# Patient Record
Sex: FEMALE | Race: WHITE | NOT HISPANIC OR LATINO | URBAN - METROPOLITAN AREA
[De-identification: names, ages, dates, MRNs, and addresses within clinical notes are randomized per-mention and may not be internally consistent; named-entity substitution may affect disease eponyms.]

---

## 2018-11-19 ENCOUNTER — INPATIENT (INPATIENT)
Facility: HOSPITAL | Age: 48
LOS: 1 days | Discharge: HOME | DRG: 552 | End: 2018-11-21
Attending: INTERNAL MEDICINE | Admitting: INTERNAL MEDICINE
Payer: COMMERCIAL

## 2018-11-19 VITALS
OXYGEN SATURATION: 97 % | SYSTOLIC BLOOD PRESSURE: 189 MMHG | DIASTOLIC BLOOD PRESSURE: 90 MMHG | HEART RATE: 79 BPM | RESPIRATION RATE: 16 BRPM | TEMPERATURE: 98 F

## 2018-11-19 LAB
ALBUMIN SERPL ELPH-MCNC: 4.7 G/DL — SIGNIFICANT CHANGE UP (ref 3.3–5)
ALP SERPL-CCNC: 43 U/L — SIGNIFICANT CHANGE UP (ref 40–120)
ALT FLD-CCNC: 11 U/L — SIGNIFICANT CHANGE UP (ref 10–45)
ANION GAP SERPL CALC-SCNC: 14 MMOL/L — SIGNIFICANT CHANGE UP (ref 5–17)
APPEARANCE UR: CLEAR — SIGNIFICANT CHANGE UP
AST SERPL-CCNC: 15 U/L — SIGNIFICANT CHANGE UP (ref 10–40)
BASOPHILS NFR BLD AUTO: 0.1 % — SIGNIFICANT CHANGE UP (ref 0–2)
BILIRUB SERPL-MCNC: 0.6 MG/DL — SIGNIFICANT CHANGE UP (ref 0.2–1.2)
BILIRUB UR-MCNC: NEGATIVE — SIGNIFICANT CHANGE UP
BUN SERPL-MCNC: 15 MG/DL — SIGNIFICANT CHANGE UP (ref 7–23)
CALCIUM SERPL-MCNC: 9.4 MG/DL — SIGNIFICANT CHANGE UP (ref 8.4–10.5)
CHLORIDE SERPL-SCNC: 103 MMOL/L — SIGNIFICANT CHANGE UP (ref 96–108)
CO2 SERPL-SCNC: 23 MMOL/L — SIGNIFICANT CHANGE UP (ref 22–31)
COLOR SPEC: YELLOW — SIGNIFICANT CHANGE UP
CREAT SERPL-MCNC: 0.56 MG/DL — SIGNIFICANT CHANGE UP (ref 0.5–1.3)
DIFF PNL FLD: NEGATIVE — SIGNIFICANT CHANGE UP
GLUCOSE SERPL-MCNC: 120 MG/DL — HIGH (ref 70–99)
GLUCOSE UR QL: NEGATIVE — SIGNIFICANT CHANGE UP
HCT VFR BLD CALC: 42.7 % — SIGNIFICANT CHANGE UP (ref 34.5–45)
HGB BLD-MCNC: 14.5 G/DL — SIGNIFICANT CHANGE UP (ref 11.5–15.5)
KETONES UR-MCNC: 15 MG/DL
LEUKOCYTE ESTERASE UR-ACNC: NEGATIVE — SIGNIFICANT CHANGE UP
LYMPHOCYTES # BLD AUTO: 10.2 % — LOW (ref 13–44)
MCHC RBC-ENTMCNC: 31.5 PG — SIGNIFICANT CHANGE UP (ref 27–34)
MCHC RBC-ENTMCNC: 34 G/DL — SIGNIFICANT CHANGE UP (ref 32–36)
MCV RBC AUTO: 92.6 FL — SIGNIFICANT CHANGE UP (ref 80–100)
MONOCYTES NFR BLD AUTO: 5.8 % — SIGNIFICANT CHANGE UP (ref 2–14)
NEUTROPHILS NFR BLD AUTO: 83.9 % — HIGH (ref 43–77)
NITRITE UR-MCNC: NEGATIVE — SIGNIFICANT CHANGE UP
PH UR: 6 — SIGNIFICANT CHANGE UP (ref 5–8)
PLATELET # BLD AUTO: 237 K/UL — SIGNIFICANT CHANGE UP (ref 150–400)
POTASSIUM SERPL-MCNC: 4.1 MMOL/L — SIGNIFICANT CHANGE UP (ref 3.5–5.3)
POTASSIUM SERPL-SCNC: 4.1 MMOL/L — SIGNIFICANT CHANGE UP (ref 3.5–5.3)
PROT SERPL-MCNC: 8.2 G/DL — SIGNIFICANT CHANGE UP (ref 6–8.3)
PROT UR-MCNC: 30 MG/DL
RBC # BLD: 4.61 M/UL — SIGNIFICANT CHANGE UP (ref 3.8–5.2)
RBC # FLD: 12.9 % — SIGNIFICANT CHANGE UP (ref 10.3–16.9)
SODIUM SERPL-SCNC: 140 MMOL/L — SIGNIFICANT CHANGE UP (ref 135–145)
SP GR SPEC: >=1.03 — SIGNIFICANT CHANGE UP (ref 1–1.03)
UROBILINOGEN FLD QL: 0.2 E.U./DL — SIGNIFICANT CHANGE UP
WBC # BLD: 9.2 K/UL — SIGNIFICANT CHANGE UP (ref 3.8–10.5)
WBC # FLD AUTO: 9.2 K/UL — SIGNIFICANT CHANGE UP (ref 3.8–10.5)

## 2018-11-19 PROCEDURE — 72131 CT LUMBAR SPINE W/O DYE: CPT | Mod: 26

## 2018-11-19 PROCEDURE — 72148 MRI LUMBAR SPINE W/O DYE: CPT | Mod: 26

## 2018-11-19 PROCEDURE — 99285 EMERGENCY DEPT VISIT HI MDM: CPT

## 2018-11-19 PROCEDURE — 73562 X-RAY EXAM OF KNEE 3: CPT | Mod: 26,LT

## 2018-11-19 RX ORDER — MORPHINE SULFATE 50 MG/1
4 CAPSULE, EXTENDED RELEASE ORAL ONCE
Qty: 0 | Refills: 0 | Status: DISCONTINUED | OUTPATIENT
Start: 2018-11-19 | End: 2018-11-19

## 2018-11-19 RX ORDER — DIAZEPAM 5 MG
5 TABLET ORAL ONCE
Qty: 0 | Refills: 0 | Status: DISCONTINUED | OUTPATIENT
Start: 2018-11-19 | End: 2018-11-19

## 2018-11-19 RX ORDER — KETOROLAC TROMETHAMINE 30 MG/ML
15 SYRINGE (ML) INJECTION ONCE
Qty: 0 | Refills: 0 | Status: DISCONTINUED | OUTPATIENT
Start: 2018-11-19 | End: 2018-11-19

## 2018-11-19 RX ADMIN — Medication 15 MILLIGRAM(S): at 19:21

## 2018-11-19 RX ADMIN — MORPHINE SULFATE 4 MILLIGRAM(S): 50 CAPSULE, EXTENDED RELEASE ORAL at 19:23

## 2018-11-19 RX ADMIN — Medication 5 MILLIGRAM(S): at 19:23

## 2018-11-19 RX ADMIN — MORPHINE SULFATE 4 MILLIGRAM(S): 50 CAPSULE, EXTENDED RELEASE ORAL at 16:13

## 2018-11-19 NOTE — ED ADULT NURSE NOTE - CHPI ED NUR SYMPTOMS NEG
no constipation/no anorexia/no bladder dysfunction/no bowel dysfunction/no numbness/no neck tenderness/no tingling

## 2018-11-19 NOTE — ED PROVIDER NOTE - PHYSICAL EXAMINATION
CONSTITUTIONAL: Well-appearing; well-nourished; in no apparent distress.   HEAD: Normocephalic; atraumatic.   EYES: PERRL; EOM intact; conjunctiva and sclera clear  ENT: normal nose; no rhinorrhea; normal pharynx with no erythema or lesions.   NECK: Supple; non-tender;   CARDIOVASCULAR: Normal S1, S2; no murmurs, rubs, or gallops. Regular rate and rhythm.   RESPIRATORY: Breathing easily; breath sounds clear and equal bilaterally; no wheezes, rhonchi, or rales.  MSK: +tenderness L knee, no erythema, warmth or swelling. NO calf tenderness. Limited extension from hip due to pain.   Back: +ttp to L lumbar paraspinal into buttocks, no midline tenderness. Pt unable to twist or sit up due to pain   EXT: No cyanosis or edema; N/V intact  SKIN: Normal for age and race; warm; dry; good turgor; no apparent lesions or rash. CONSTITUTIONAL: Well-appearing; well-nourished; in no apparent distress.   HEAD: Normocephalic; atraumatic.   EYES: PERRL; EOM intact; conjunctiva and sclera clear  ENT: normal nose; no rhinorrhea; normal pharynx with no erythema or lesions.   NECK: Supple; non-tender;   CARDIOVASCULAR: Normal S1, S2; no murmurs, rubs, or gallops. Regular rate and rhythm.   RESPIRATORY: Breathing easily; breath sounds clear and equal bilaterally; no wheezes, rhonchi, or rales.  MSK: +tenderness L knee, no erythema, warmth or swelling. NO calf tenderness. Limited extension from hip due to pain.   Back: +ttp to L lumbar paraspinal into buttocks, no midline tenderness. Pt unable to twist or sit up due to pain   Rectal- good tone. No saddle anesthesia   EXT: No cyanosis or edema; N/V intact  SKIN: Normal for age and race; warm; dry; good turgor; no apparent lesions or rash.

## 2018-11-19 NOTE — ED ADULT NURSE NOTE - OBJECTIVE STATEMENT
Pt reports worsening sciatica since Thursday, reports no relief with medications that she was given from Urgent care and reports she is unable to walk

## 2018-11-19 NOTE — ED ADULT TRIAGE NOTE - OTHER COMPLAINTS
pt c.o lower abd pain, mostly L sided worsening today. reports seeing her doctor yesterday, prescribed medication with no relief. denies injury/fall

## 2018-11-19 NOTE — ED PROVIDER NOTE - OBJECTIVE STATEMENT
49 yo F with pmh of HTN and endometriosis c/o L lower back pain radiating around to front of thigh and into knee x 4 days. Pain is sharp and getting progressively worse. Pt states 4 days ago she bent over to unplug the TV and felt a pull in her back. The following day her pain started. Pt started taking alleve with no relief. Pt then went to urgent care yesterday, diagnosed with sciatica and given flexeril, motrin, percocet and medrol dose pack. Pt reports pain was worse today and she cannot put pressure on her L leg. Denies numbness, tingling, incontinence, abd pain, hematuria, dysuria.

## 2018-11-19 NOTE — ED PROVIDER NOTE - MEDICAL DECISION MAKING DETAILS
49 yo F with pmh of HTN and endometriosis c/o L lower back pain radiating around to front of thigh and into knee x 4 days. Progressively worsening despite pain meds. No CE symptoms. +tenderness L knee, no erythema, warmth or swelling. NO calf tenderness. Limited extension from hip due to pain; +ttp to L lumbar paraspinal into buttocks, no midline tenderness. Pt unable to twist or sit up due to pain 47 yo F with pmh of HTN and endometriosis c/o L lower back pain radiating around to front of thigh and into knee x 4 days. Progressively worsening despite pain meds. No CE symptoms. +tenderness L knee, no erythema, warmth or swelling. NO calf tenderness. Limited extension from hip due to pain; +ttp to L lumbar paraspinal into buttocks, no midline tenderness. Pt unable to twist or sit up due to pain. Likely sciatica.

## 2018-11-20 DIAGNOSIS — I10 ESSENTIAL (PRIMARY) HYPERTENSION: ICD-10-CM

## 2018-11-20 DIAGNOSIS — Z91.89 OTHER SPECIFIED PERSONAL RISK FACTORS, NOT ELSEWHERE CLASSIFIED: ICD-10-CM

## 2018-11-20 DIAGNOSIS — N80.1 ENDOMETRIOSIS OF OVARY: Chronic | ICD-10-CM

## 2018-11-20 DIAGNOSIS — M54.16 RADICULOPATHY, LUMBAR REGION: ICD-10-CM

## 2018-11-20 DIAGNOSIS — N80.9 ENDOMETRIOSIS, UNSPECIFIED: ICD-10-CM

## 2018-11-20 DIAGNOSIS — R63.8 OTHER SYMPTOMS AND SIGNS CONCERNING FOOD AND FLUID INTAKE: ICD-10-CM

## 2018-11-20 DIAGNOSIS — Z29.9 ENCOUNTER FOR PROPHYLACTIC MEASURES, UNSPECIFIED: ICD-10-CM

## 2018-11-20 DIAGNOSIS — M25.562 PAIN IN LEFT KNEE: ICD-10-CM

## 2018-11-20 PROCEDURE — 12345: CPT | Mod: NC,GC

## 2018-11-20 PROCEDURE — 99222 1ST HOSP IP/OBS MODERATE 55: CPT | Mod: GC

## 2018-11-20 RX ORDER — OXYCODONE HYDROCHLORIDE 5 MG/1
5 TABLET ORAL EVERY 6 HOURS
Qty: 0 | Refills: 0 | Status: DISCONTINUED | OUTPATIENT
Start: 2018-11-20 | End: 2018-11-21

## 2018-11-20 RX ORDER — DOCUSATE SODIUM 100 MG
100 CAPSULE ORAL
Qty: 0 | Refills: 0 | Status: DISCONTINUED | OUTPATIENT
Start: 2018-11-20 | End: 2018-11-21

## 2018-11-20 RX ORDER — METOPROLOL TARTRATE 50 MG
25 TABLET ORAL DAILY
Qty: 0 | Refills: 0 | Status: DISCONTINUED | OUTPATIENT
Start: 2018-11-20 | End: 2018-11-21

## 2018-11-20 RX ORDER — SENNA PLUS 8.6 MG/1
2 TABLET ORAL AT BEDTIME
Qty: 0 | Refills: 0 | Status: DISCONTINUED | OUTPATIENT
Start: 2018-11-20 | End: 2018-11-21

## 2018-11-20 RX ORDER — OXYCODONE HYDROCHLORIDE 5 MG/1
10 TABLET ORAL EVERY 12 HOURS
Qty: 0 | Refills: 0 | Status: DISCONTINUED | OUTPATIENT
Start: 2018-11-20 | End: 2018-11-21

## 2018-11-20 RX ORDER — KETOROLAC TROMETHAMINE 30 MG/ML
15 SYRINGE (ML) INJECTION EVERY 6 HOURS
Qty: 0 | Refills: 0 | Status: DISCONTINUED | OUTPATIENT
Start: 2018-11-20 | End: 2018-11-20

## 2018-11-20 RX ORDER — MEDROXYPROGESTERONE ACETATE 150 MG/ML
1 INJECTION, SUSPENSION, EXTENDED RELEASE INTRAMUSCULAR
Qty: 0 | Refills: 0 | COMMUNITY

## 2018-11-20 RX ORDER — POLYETHYLENE GLYCOL 3350 17 G/17G
17 POWDER, FOR SOLUTION ORAL
Qty: 0 | Refills: 0 | Status: DISCONTINUED | OUTPATIENT
Start: 2018-11-20 | End: 2018-11-21

## 2018-11-20 RX ORDER — OXYCODONE AND ACETAMINOPHEN 5; 325 MG/1; MG/1
1 TABLET ORAL EVERY 6 HOURS
Qty: 0 | Refills: 0 | Status: DISCONTINUED | OUTPATIENT
Start: 2018-11-20 | End: 2018-11-20

## 2018-11-20 RX ORDER — GABAPENTIN 400 MG/1
300 CAPSULE ORAL EVERY 8 HOURS
Qty: 0 | Refills: 0 | Status: DISCONTINUED | OUTPATIENT
Start: 2018-11-20 | End: 2018-11-21

## 2018-11-20 RX ORDER — FAMOTIDINE 10 MG/ML
20 INJECTION INTRAVENOUS DAILY
Qty: 0 | Refills: 0 | Status: DISCONTINUED | OUTPATIENT
Start: 2018-11-20 | End: 2018-11-21

## 2018-11-20 RX ORDER — KETOROLAC TROMETHAMINE 30 MG/ML
15 SYRINGE (ML) INJECTION EVERY 6 HOURS
Qty: 0 | Refills: 0 | Status: DISCONTINUED | OUTPATIENT
Start: 2018-11-20 | End: 2018-11-21

## 2018-11-20 RX ORDER — GABAPENTIN 400 MG/1
100 CAPSULE ORAL DAILY
Qty: 0 | Refills: 0 | Status: DISCONTINUED | OUTPATIENT
Start: 2018-11-20 | End: 2018-11-20

## 2018-11-20 RX ORDER — MORPHINE SULFATE 50 MG/1
4 CAPSULE, EXTENDED RELEASE ORAL ONCE
Qty: 0 | Refills: 0 | Status: DISCONTINUED | OUTPATIENT
Start: 2018-11-20 | End: 2018-11-20

## 2018-11-20 RX ORDER — ACETAMINOPHEN 500 MG
650 TABLET ORAL EVERY 6 HOURS
Qty: 0 | Refills: 0 | Status: DISCONTINUED | OUTPATIENT
Start: 2018-11-20 | End: 2018-11-21

## 2018-11-20 RX ORDER — METOPROLOL TARTRATE 50 MG
1 TABLET ORAL
Qty: 0 | Refills: 0 | COMMUNITY

## 2018-11-20 RX ADMIN — OXYCODONE HYDROCHLORIDE 10 MILLIGRAM(S): 5 TABLET ORAL at 22:30

## 2018-11-20 RX ADMIN — Medication 15 MILLIGRAM(S): at 19:07

## 2018-11-20 RX ADMIN — OXYCODONE HYDROCHLORIDE 10 MILLIGRAM(S): 5 TABLET ORAL at 22:29

## 2018-11-20 RX ADMIN — Medication 15 MILLIGRAM(S): at 15:00

## 2018-11-20 RX ADMIN — Medication 15 MILLIGRAM(S): at 07:52

## 2018-11-20 RX ADMIN — Medication 15 MILLIGRAM(S): at 20:01

## 2018-11-20 RX ADMIN — OXYCODONE HYDROCHLORIDE 10 MILLIGRAM(S): 5 TABLET ORAL at 12:00

## 2018-11-20 RX ADMIN — Medication 650 MILLIGRAM(S): at 06:46

## 2018-11-20 RX ADMIN — GABAPENTIN 300 MILLIGRAM(S): 400 CAPSULE ORAL at 14:33

## 2018-11-20 RX ADMIN — Medication 650 MILLIGRAM(S): at 19:59

## 2018-11-20 RX ADMIN — Medication 650 MILLIGRAM(S): at 07:30

## 2018-11-20 RX ADMIN — Medication 25 MILLIGRAM(S): at 06:46

## 2018-11-20 RX ADMIN — POLYETHYLENE GLYCOL 3350 17 GRAM(S): 17 POWDER, FOR SOLUTION ORAL at 18:40

## 2018-11-20 RX ADMIN — MORPHINE SULFATE 4 MILLIGRAM(S): 50 CAPSULE, EXTENDED RELEASE ORAL at 04:20

## 2018-11-20 RX ADMIN — Medication 15 MILLIGRAM(S): at 02:14

## 2018-11-20 RX ADMIN — GABAPENTIN 300 MILLIGRAM(S): 400 CAPSULE ORAL at 22:26

## 2018-11-20 RX ADMIN — Medication 650 MILLIGRAM(S): at 13:00

## 2018-11-20 RX ADMIN — SENNA PLUS 2 TABLET(S): 8.6 TABLET ORAL at 22:27

## 2018-11-20 RX ADMIN — Medication 650 MILLIGRAM(S): at 12:32

## 2018-11-20 RX ADMIN — Medication 15 MILLIGRAM(S): at 08:05

## 2018-11-20 RX ADMIN — Medication 650 MILLIGRAM(S): at 18:39

## 2018-11-20 RX ADMIN — MORPHINE SULFATE 4 MILLIGRAM(S): 50 CAPSULE, EXTENDED RELEASE ORAL at 04:00

## 2018-11-20 RX ADMIN — OXYCODONE HYDROCHLORIDE 10 MILLIGRAM(S): 5 TABLET ORAL at 11:20

## 2018-11-20 RX ADMIN — FAMOTIDINE 20 MILLIGRAM(S): 10 INJECTION INTRAVENOUS at 18:39

## 2018-11-20 RX ADMIN — Medication 15 MILLIGRAM(S): at 14:33

## 2018-11-20 RX ADMIN — Medication 100 MILLIGRAM(S): at 18:40

## 2018-11-20 NOTE — H&P ADULT - NSHPLABSRESULTS_GEN_ALL_CORE
.  LABS:                         14.5   9.2   )-----------( 237      ( 19 Nov 2018 16:10 )             42.7     11-19    140  |  103  |  15  ----------------------------<  120<H>  4.1   |  23  |  0.56    Ca    9.4      19 Nov 2018 16:10    TPro  8.2  /  Alb  4.7  /  TBili  0.6  /  DBili  x   /  AST  15  /  ALT  11  /  AlkPhos  43  11-19      Urinalysis Basic - ( 19 Nov 2018 18:11 )    Color: Yellow / Appearance: Clear / SG: >=1.030 / pH: x  Gluc: x / Ketone: 15 mg/dL  / Bili: Negative / Urobili: 0.2 E.U./dL   Blood: x / Protein: 30 mg/dL / Nitrite: NEGATIVE   Leuk Esterase: NEGATIVE / RBC: < 5 /HPF / WBC < 5 /HPF   Sq Epi: x / Non Sq Epi: x / Bacteria: Present /HPF                RADIOLOGY, EKG & ADDITIONAL TESTS:     < from: CT Lumbar Spine No Cont (11.19.18 @ 18:47) >      IMPRESSION: Degenerative disc disease with disc bulge at L5/S1 abutting   the left L5 nerve root. Large left foraminal disc herniation at L3/4   compressing the left L3 nerve root. Correlation with the patient's   clinical symptoms is recommended.    < end of copied text >    < from: MR Lumbar Spine No Cont (11.19.18 @ 21:33) >    IMPRESSION:   1.  No significant spinal canal stenosis.  2.  Disc herniations at L3-L4 and L5-S1 resulting in moderate to severe   left neural foraminal narrowing at those levels.    < end of copied text >    XRAY left knee per verbal radiology resident read: NO fracture, no effusion.

## 2018-11-20 NOTE — H&P ADULT - PROBLEM SELECTOR PLAN 2
L. knee pain is main complaint. Tender on exam, which should not be due to radiculopathy. XRAY reportedly unremarkable per radiology resident read. NO inciting trauma, unclear if pain is related to radiculopathy or other etiology.   - consider ortho consult in AM. L. knee pain is main complaint. Tender on exam, which should not be due to radiculopathy. XRAY reportedly unremarkable per radiology resident read. NO inciting trauma, unclear if pain is related to radiculopathy or other etiology.   - consider ortho consult in AM.    Addendum:  Resolved on exam at 5 am L. knee pain is main complaint. Tender on exam, which should not be due to radiculopathy. XRAY reportedly unremarkable per radiology resident read. NO inciting trauma, unclear if pain is related to radiculopathy or other etiology.   - ortho consult in AM.    Addendum:  Resolved on exam at 5 am

## 2018-11-20 NOTE — PHYSICAL THERAPY INITIAL EVALUATION ADULT - CRITERIA FOR SKILLED THERAPEUTIC INTERVENTIONS
anticipated discharge recommendation/impairments found/rehab potential/therapy frequency/functional limitations in following categories

## 2018-11-20 NOTE — H&P ADULT - PROBLEM SELECTOR PLAN 5
VTE PPX: IMPROVE 1 (for immobilization), low risk, no pharm ppx.  Encourage ambulation once pain is controlled.

## 2018-11-20 NOTE — H&P ADULT - NSHPREVIEWOFSYSTEMS_GEN_ALL_CORE
REVIEW OF SYSTEMS:    CONSTITUTIONAL: No weakness, fevers or chills  EYES/ENT: No visual changes;  No vertigo or throat pain   NECK: No pain or stiffness  RESPIRATORY: No cough, wheezing, hemoptysis; No shortness of breath  CARDIOVASCULAR: No chest pain or palpitations  GASTROINTESTINAL: No abdominal or epigastric pain. No vomiting, or hematemesis; No diarrhea or constipation. No melena or hematochezia.  GENITOURINARY: No dysuria, frequency or hematuria  NEUROLOGICAL:as above   SKIN: No itching, rashes

## 2018-11-20 NOTE — PROGRESS NOTE ADULT - ASSESSMENT
47 yo F w/PMH endometriosis, HTN presents to ED complaining of left knee pain and lower back pain found to have disc herniation at L3-L4 and L5-S1 with compressed nerve roots at L3 and possibly at L5. Admitted for pain management in the setting of radiculopathy due to herniated discs.

## 2018-11-20 NOTE — PHYSICAL THERAPY INITIAL EVALUATION ADULT - GENERAL OBSERVATIONS, REHAB EVAL
Pt received supine in bed with +hep-lock, NAD. Pt left supine in bed with call tatum in reach, LAKEISHA, RN awares, Dr Bal is notified post PT session.

## 2018-11-20 NOTE — CONSULT NOTE ADULT - SUBJECTIVE AND OBJECTIVE BOX
NP Note Neurosurgery Dr Mcknight Consult Note    History of Present Illness: 	  Patient is a 47 yo F w/PMH endometriosis, HTN presents to ED complaining of left knee pain and lower back pain. Patient had been in usual state of health until Thursday 11/15 when she bent over to pull out a plug from TV and felt a "pop" in her back. After that she started to experience pain in her lower back more on the left side radiating to her knee and anterior thigh. The pain continued to worsen so she went to an urgent care center on 11/18 where she was told she had sciatica and prescribed percocet, motrin, medrol and flexeril. She took these medications with minimal relief.  She woke up this AM with pain so severe she could not bear weight on her left side, which prompted her to present to the ED. The pain is mainly in her left knee. It is 10/10 at worst, 6/10 in ED after pain medication. Described as a cramping, burning pain. Worse with movement, minimally relieved by rest and morphine, toradol, diazepam in ED. Associated with paresthesias on right anterior thigh, no numbness. Strength intact, limited only due to pain. No saddle anesthesia, urinary or fecal incontinence. No fevers/chills/unintentional weight loss. + nasuea due to pain, no vomiting. This has never happened to her before.     In ED VS: Tm 98.2F, HR 79-82,  /90 -> 175/98,  RR 16-18, O2% 93-98 RA  MRI done showing disc herniations at L3-L4 and L5-S1 resulting in moderate to severe   left neural foraminal narrowing at those levels, CT L- spine showing degenerative disc disease with disc bulge at L5/S1 abutting the left L5 nerve root, large left foraminal disc herniation at L3/4 compressing the left L3 nerve root.  XRAY left knee unremarkable per radiology resident read.   Patient admitted for inability to ambulate likely 2/2 lumbar radiculopathy.     Review of Systems: REVIEW OF SYSTEMS:   CONSTITUTIONAL: No weakness, fevers or chills  EYES/ENT: No visual changes;  No vertigo or throat pain   NECK: No pain or stiffness  RESPIRATORY: No cough, wheezing, hemoptysis; No shortness of breath  CARDIOVASCULAR: No chest pain or palpitations  GASTROINTESTINAL: No abdominal or epigastric pain. No vomiting, or hematemesis; No diarrhea or constipation. No melena or hematochezia.  GENITOURINARY: No dysuria, frequency or hematuria  NEUROLOGICAL:as above  SKIN: No itching, rashes	  Other Review of Systems: All other review of systems negative, except as noted in HPI	    Allergies and Intolerances:        Allergies:  	No Known Allergies:     Home Medications:   * Patient Currently Takes Medications as of 20-Nov-2018 02:21 documented in Structured Notes  · 	Metoprolol Succinate ER 25 mg oral tablet, extended release: 1 tab(s) orally once a day, Last Dose Taken:    · 	Depo-Provera 400 mg/mL intramuscular suspension: 1 each intramuscular every 3 months, Last Dose Taken:      .    Patient History:   Past Medical History:  Endometriosis    Essential hypertension.    Past Surgical History:  Endometriotic cyst of ovary. NP Note Neurosurgery Dr Mcknight Consult Note    History of Present Illness: 	  Patient is a 49 yo F w/PMH endometriosis, HTN presents to ED complaining of left knee pain and lower back pain. Patient had been in usual state of health until Thursday 11/15 when she bent over to pull out a plug from TV and felt a "pop" in her back. After that she started to experience pain in her lower back more on the left side radiating to her knee and anterior thigh. The pain continued to worsen so she went to an urgent care center on 11/18 where she was told she had sciatica and prescribed percocet, motrin, medrol and flexeril. She took these medications with minimal relief.  She woke up this AM with pain so severe she could not bear weight on her left side, which prompted her to present to the ED. The pain is mainly in her left knee. It is 10/10 at worst, 6/10 in ED after pain medication. Described as a cramping, burning pain. Worse with movement, minimally relieved by rest and morphine, toradol, diazepam in ED. Associated with paresthesias on right anterior thigh, no numbness. Strength intact, limited only due to pain. No saddle anesthesia, urinary or fecal incontinence. No fevers/chills/unintentional weight loss. + nasuea due to pain, no vomiting. This has never happened to her before.     In ED VS: Tm 98.2F, HR 79-82,  /90 -> 175/98,  RR 16-18, O2% 93-98 RA  MRI done showing disc herniations at L3-L4 and L5-S1 resulting in moderate to severe   left neural foraminal narrowing at those levels, CT L- spine showing degenerative disc disease with disc bulge at L5/S1 abutting the left L5 nerve root, large left foraminal disc herniation at L3/4 compressing the left L3 nerve root.  XRAY left knee unremarkable per radiology resident read.   Patient admitted for inability to ambulate likely 2/2 lumbar radiculopathy.     Review of Systems: REVIEW OF SYSTEMS:   CONSTITUTIONAL: No weakness, fevers or chills  EYES/ENT: No visual changes;  No vertigo or throat pain   NECK: No pain or stiffness  RESPIRATORY: No cough, wheezing, hemoptysis; No shortness of breath  CARDIOVASCULAR: No chest pain or palpitations  GASTROINTESTINAL: No abdominal or epigastric pain. No vomiting, or hematemesis; No diarrhea or constipation. No melena or hematochezia.  GENITOURINARY: No dysuria, frequency or hematuria  NEUROLOGICAL:as above  SKIN: No itching, rashes	  Other Review of Systems: All other review of systems negative, except as noted in HPI	    Allergies and Intolerances:        Allergies:  	No Known Allergies:     Home Medications:   * Patient Currently Takes Medications as of 20-Nov-2018 02:21 documented in Structured Notes  · 	Metoprolol Succinate ER 25 mg oral tablet, extended release: 1 tab(s) orally once a day, Last Dose Taken:    · 	Depo-Provera 400 mg/mL intramuscular suspension: 1 each intramuscular every 3 months, Last Dose Taken:      .    Patient History:   Past Medical History:  Endometriosis    Essential hypertension.    Past Surgical History:  Endometriotic cyst of ovary.    	    ROS:  CV RRR  PV + peripheal pulses warm to touch + capillary refill  Pulm: Lungs CTAB Respirations regular and unlabored  GI: Abdomen round soft + BS  : Voiding without difficulty  Skin warm and dry  Neuro A&OX3 Cranial nerves intact  BROWN antigravity LLE 4/5 secondary to pain  JPS intact Bilaterally  no conus no hoffmans sign    A/ from: MR Lumbar Spine No Cont (11.19.18 @ 21:33) >  IMPRESSION:   1.  No significant spinal canal stenosis.  2.  Disc herniations at L3-L4 and L5-S1 resulting in moderate to severe   left neural foraminal narrowing at those levels.        < end of copied text >      P/  Monitor neuro status  OT/PT  Pain Managment  Neurology consulted if need to  No surgical intervention at this time as per Dr Mcknight  Please reconsult if pt neurologically declines    Dispo: Discussed with attending

## 2018-11-20 NOTE — H&P ADULT - HISTORY OF PRESENT ILLNESS
Patient is a 49 yo F w/PMH endometriosis, HTN presents to ED complaining of left knee pain and lower back pain. Patient had been in usual state of health until Thursday 11/15 when she bent over to pull out a plug from TV and felt a "pop" in her back. After that she started to experience pain in her lower back more on the left side radiating to her knee and anterior thigh. The pain continued to worsen so she went to an urgent care center on 11/18 where she was told she had sciatica and prescribed percocet, motrin, medrol and flexeril. She took these medications with minimal relief.  She woke up this AM with pain so severe she could not bear weight on her left side, which prompted her to present to the ED. The pain is mainly in her left knee. It is 10/10 at worst, 6/10 in ED after pain medication. Described as a cramping, burning pain. Worse with movement, minimally relieved by rest and morphine, toradol, diazepam in ED. Associated with paresthesias on right anterior thigh, no numbness. Strength intact, limited only due to pain. No saddle anesthesia, urinary or fecal incontinence. No fevers/chills/unintentional weight loss. + nasuea due to pain, no vomiting. This has never happened to her before.     In ED VS: Tm 98.2F, HR 79-82,  /90 -> 175/98,  RR 16-18, O2% 93-98 RA  MRI done showing disc herniations at L3-L4 and L5-S1 resulting in moderate to severe   left neural foraminal narrowing at those levels, CT L- spine showing degenerative disc disease with disc bulge at L5/S1 abutting the left L5 nerve root, large left foraminal disc herniation at L3/4 compressing the left L3 nerve root.  XRAY left knee unremarkable per radiology resident read.   Patient admitted for inability to ambulate likely 2/2 lumbar radiculopathy.

## 2018-11-20 NOTE — PHYSICAL THERAPY INITIAL EVALUATION ADULT - ADDITIONAL COMMENTS
Pt lives with spouse in an apartment with elevator. Prior to admission, pt ambulated independently without assistive device, pt has a rolling walker at home.

## 2018-11-20 NOTE — H&P ADULT - ASSESSMENT
47 yo F w/PMH endometriosis, HTN presents to ED complaining of left knee pain and lower back pain found to have disc herniation at L3-L4 and L5-S1 with compressed nerve roots at L3 and possibly at L5. Admitted for pain management in the setting of radiculopathy due to herniated discs. Also with significant knee pain.

## 2018-11-20 NOTE — H&P ADULT - NSHPPHYSICALEXAM_GEN_ALL_CORE
.  VITAL SIGNS:  T(F): 98.8 (11-20-18 @ 01:49), Max: 98.8 (11-20-18 @ 01:49)  HR: 79 (11-20-18 @ 01:49) (76 - 82)  BP: 175/98 (11-20-18 @ 01:49) (160/91 - 189/90)  BP(mean): --  RR: 18 (11-20-18 @ 01:49) (16 - 18)  SpO2: 93% (11-20-18 @ 01:49) (93% - 98%)    PHYSICAL EXAM:    Constitutional: Uncomfortable appearing, wincing in pain   HEENT: NC/AT, PERRL, EOMI, anicteric sclera, no nasal discharge; uvula midline, no oropharyngeal erythema or exudates; MMM  Neck: supple; no JVD or thyromegaly  Respiratory: CTA B/L; no W/R/R, no retractions  Cardiac: +S1/S2; RRR; no M/R/G; PMI non-displaced  Gastrointestinal: soft, NT/ND; no rebound or guarding; +BSx4  Back: unable to examine back due to patient's refusal. States she is in too much pain to turn over. Patient tearful and in significant pain.   Extremities: WWP, no clubbing or cyanosis; no peripheral edema  Musculoskeletal: NROM x4; Left knee tender to palpation of patella. No effusion noted, no erythema, no swelling. ROM full but limited by pain.   Vascular: 2+ radial, femoral, DP/PT pulses B/L  Dermatologic: skin warm, dry and intact; + rosacea on face and chest, per patient chronic.   Lymphatic: no submandibular or cervical LAD  Neurologic: AAOx3; CNII-XII grossly intact; LE strength 5/5 on R side, lifting right leg causes pain in left leg. Able to lift left leg off stretcher briefly but then tearful and unable to move further. No decreased sensation but subjective feeling of paresthesia over left anterior thigh. No saddle anesthesia. .  VITAL SIGNS:  T(F): 98.8 (11-20-18 @ 01:49), Max: 98.8 (11-20-18 @ 01:49)  HR: 79 (11-20-18 @ 01:49) (76 - 82)  BP: 175/98 (11-20-18 @ 01:49) (160/91 - 189/90)  BP(mean): --  RR: 18 (11-20-18 @ 01:49) (16 - 18)  SpO2: 93% (11-20-18 @ 01:49) (93% - 98%)    PHYSICAL EXAM:    Constitutional: Uncomfortable appearing, wincing in pain   HEENT: NC/AT, PERRL, EOMI, anicteric sclera, no nasal discharge; uvula midline, no oropharyngeal erythema or exudates; MMM  Neck: supple; no JVD or thyromegaly  Respiratory: CTA B/L; no W/R/R, no retractions  Cardiac: +S1/S2; RRR; no M/R/G; PMI non-displaced  Gastrointestinal: soft, NT/ND; no rebound or guarding; +BSx4  Back: unable to examine back due to patient's refusal. States she is in too much pain to turn over. Patient tearful and in significant pain.   Extremities: WWP, no clubbing or cyanosis; no peripheral edema  Musculoskeletal: NROM x4; Left knee tender to palpation of patella. No effusion noted, no erythema, no swelling. ROM full but limited by pain.   Vascular: 2+ radial, femoral, DP/PT pulses B/L  Dermatologic: skin warm, dry and intact; + rosacea on face and chest, per patient chronic.   Lymphatic: no submandibular or cervical LAD  Neurologic: AAOx3; CNII-XII grossly intact; LE strength 5/5 on R side, lifting right leg causes pain in left leg. Able to lift left leg off stretcher briefly but then tearful and unable to move further. No decreased sensation but subjective feeling of paresthesia over left anterior thigh. No saddle anesthesia.    Attending addendum:   Agree with exam as above with the following addition/change  On my exam pt's pain better controlled as she is s/p Morphine, knee no longer TTP, full ROM, no erythema, warmth

## 2018-11-20 NOTE — PHYSICAL THERAPY INITIAL EVALUATION ADULT - MANUAL MUSCLE TESTING RESULTS, REHAB EVAL
b/l UEs~4+/5 grossly, RLE~4+/5 grossly, LLE >3/5 through out, difficult to perform formal MMT on LLE due to pain.

## 2018-11-20 NOTE — H&P ADULT - NSHPROSALLOTHERNEGRD_GEN_ALL_CORE
Subjective


Subjective


Remarks


Notes reviewed


No fever


No new (+) BC


WBC scan with some uptake in mouth - no complaints however


LFTs improving


Repeat UA now with pyuria


Abdominal pain is gone


Patient states at times, after he urinates,  he has to go again soon after


PVR done by urology 150 ml


Antibiotics


Zosyn


Cipro


Lines


PIV


Past Medical History


HTN


DM Type 2


Urinary Obstruction and retention


BPH


Past Surgical History


Cystoscopy


Allergies:  


Coded Allergies:  


     Shellfish (Verified  Allergy, Severe, Anaphylaxis, 2/21/17)





Objective


.





 Vital Signs








  Date Time  Temp Pulse Resp B/P Pulse Ox O2 Delivery O2 Flow Rate FiO2


 


2/25/17 07:15  59      


 


2/25/17 07:05 95.7 59 17 163/98 96   


 


2/25/17 03:37 96.8 53 16 141/75 95   


 


2/25/17 02:13  54  157/82 98   


 


2/25/17 00:00 96.5 50 16 130/80 96   


 


2/24/17 23:50  58      


 


2/24/17 20:27 97.3 52 16 136/80 96   


 


2/24/17 18:48      Room Air  


 


2/24/17 18:47  54      


 


2/24/17 18:31     96   21


 


2/24/17 16:20 98.4 52 16 131/80 96   














 2/24/17 2/24/17 2/25/17





 15:00 23:00 07:00


 


Intake Total 1080 ml 480 ml 449 ml


 


Output Total   200 ml


 


Balance 1080 ml 480 ml 249 ml


 


   


 


Intake Oral 1080 ml 480 ml 240 ml


 


IV Total   209 ml


 


Output Urine Total   200 ml


 


# Voids 5 3 1


 


# Bowel Movements  1 0








.





Laboratory Tests








Test 2/25/17





 04:50


 


White Blood Count 9.4 TH/MM3


 


Red Blood Count 4.76 MIL/MM3


 


Hemoglobin 13.6 GM/DL


 


Hematocrit 40.0 %


 


Mean Corpuscular Volume 83.9 FL


 


Mean Corpuscular Hemoglobin 28.5 PG


 


Mean Corpuscular Hemoglobin 34.0 %





Concent 


 


Red Cell Distribution Width 12.9 %


 


Platelet Count 205 TH/MM3


 


Mean Platelet Volume 10.0 FL


 


Neutrophils (%) (Auto) 59.1 %


 


Lymphocytes (%) (Auto) 27.3 %


 


Monocytes (%) (Auto) 10.3 %


 


Eosinophils (%) (Auto) 2.7 %


 


Basophils (%) (Auto) 0.6 %


 


Neutrophils # (Auto) 5.6 TH/MM3


 


Lymphocytes # (Auto) 2.6 TH/MM3


 


Monocytes # (Auto) 1.0 TH/MM3


 


Eosinophils # (Auto) 0.3 TH/MM3


 


Basophils # (Auto) 0.1 TH/MM3


 


CBC Comment DIFF FINAL 


 


Differential Comment  








Laboratory Tests








Test 2/25/17





 04:50


 


Sodium Level 139 MEQ/L


 


Potassium Level 3.8 MEQ/L


 


Chloride Level 104 MEQ/L


 


Carbon Dioxide Level 27.3 MEQ/L


 


Anion Gap 8 MEQ/L


 


Blood Urea Nitrogen 13 MG/DL


 


Creatinine 1.31 MG/DL


 


Estimat Glomerular Filtration 67 ML/MIN





Rate 


 


Random Glucose 171 MG/DL


 


Calcium Level 8.6 MG/DL


 


Total Bilirubin 0.8 MG/DL


 


Direct Bilirubin 0.1 MG/DL


 


Indirect Bilirubin 0.7 MG/DL


 


Aspartate Amino Transf 65 U/L





(AST/SGOT) 


 


Alanine Aminotransferase 175 U/L





(ALT/SGPT) 


 


Alkaline Phosphatase 129 U/L


 


Total Protein 7.4 GM/DL


 


Albumin 3.0 GM/DL








Microbiology








 Date/Time Procedure Status





Source Growth 


 


 2/22/17 16:04 Aerobic Blood Culture - Preliminary Resulted





Blood Peripheral NO GROWTH IN 3 DAYS 





 2/22/17 16:04 Anaerobic Blood Culture - Final Resulted





Blood Peripheral QNS - SEE AEROBE REPORT 


 


 2/22/17 16:04 Aerobic Blood Culture - Preliminary Resulted





Blood Peripheral NO GROWTH IN 3 DAYS 





 2/22/17 16:04 Anaerobic Blood Culture - Final Resulted





Blood Peripheral QNS - SEE AEROBE REPORT 


 


 2/24/17 16:55 Urine Culture - Preliminary Resulted





Urine Clean Catch NO GROWTH IN 24 HOURS. 








Imaging














Gall Bladder Ultrasound 2/21/17 0228 Signed





Impressions: 





 Service Date/Time:  Tuesday, February 21, 2017 02:51 - CONCLUSION: Fatty 

liver.  





    López Peters MD 


 


Abdomen/Pelvis CT 2/21/17 0103 Signed





Impressions: 





 Service Date/Time:  Tuesday, February 21, 2017 01:51 - CONCLUSION:  1. 

Prostatic 





 enlargement with suspected very prominent compression on the bladder floor 





 creating a masslike area in the bladder floor. 2. Fatty infiltration liver. 3. 





 Nonobstructing left renal stone. 4. Bibasilar areas of consolidation or 





 atelectasis at the lung bases    López Peters MD 


 


Chest X-Ray 2/21/17 0018 Signed





Impressions: 





 Service Date/Time:  Tuesday, February 21, 2017 00:34 - CONCLUSION:  Expiratory 





 chest x-ray with suspected borderline cardiomegaly.     López Peters MD 








Physical Exam


GENERAL:   awake and alert,  NAD


SKIN:   Warm and dry.  No generalized rash 


HEENT:  Pink conjunctivae, no petechia or hemorrhage.  Moist oral mucosa.  


NECK:  Supple, nontender, no meningeal signs.


CARDIOVASCULAR: Regular rate and rhythm without murmurs, gallops, or rubs. 


RESPIRATORY: Clear to auscultation. Breath sounds equal bilaterally. No wheezes

, rales, or rhonchi.  


GASTROINTESTINAL: Abdomen soft,  not distended, not tender.  No guarding. No 

rebound


MUSCULOSKELETAL: Extremities without clubbing, cyanosis, or edema.  No calf 

tenderness.  


NEUROLOGICAL:   Non-focal


PSYCH:  Normal affect, calm and cooperative


LINE:  PIV with no evidence of infection





Assessment & Plan


Remarks


IMPRESSION


Klebsiella and Enterobacter bacteremia, source?


   -  ?GI/biliary, ?


   -  ?prostate


   -  clinically no evidence of PNA,  CT findings likely more of atelectasis 

than consolidation


   -  ?intermittent incomplete emptying


   -  no dental complaints


Had previous PSAE bacteremia


Previous problem with urinary retention


Enlarged prostate


Renal insufficiency


Diabetes








RECOMMENDATION


Change to Levaquin (Once a day)


Stop  IV Zosyn after today


Repeat Urine C/S


Monitor progress


Likely 2-3 weeks more of oral Abx on D/C





Explained plan to patient








Kylah Duarte MD Feb 25, 2017 13:36 All other review of systems negative, except as noted in HPI

## 2018-11-20 NOTE — H&P ADULT - PROBLEM SELECTOR PLAN 3
BP elevated 183/68. Patient took her home toprol this AM. Likely elevated BP in the setting of severe pain.   - pain management as above  - if still elevated when pain is controlled consider adding another agent  - c/w toprol XL 25 mg PO daily

## 2018-11-20 NOTE — H&P ADULT - PROBLEM SELECTOR PLAN 1
Patient with CT/MRI L spine showing disc herniation at L3-L4 and L5-S1 with compressed nerve roots at L3 and possibly at L5. Consistent with exam, with pain down anterior thigh, worse at the knee.   - toradol 15 mg IV q6 PRN given no kidney issues or history of bleeding  - morphine 4 mg IV PRN severe pain (order as needed)  - will start gabapentin at low dose 100 mg PO daily given neuropathic pain.  - consider ortho consult in AM  - consider pain management consult in AM  - PT Patient with CT/MRI L spine showing disc herniation at L3-L4 and L5-S1 with compressed nerve roots at L3 and possibly at L5. Consistent with exam, with pain down anterior thigh, worse at the knee.   -Tylenol Standing  - toradol 15 mg IV q6 given no kidney issues or history of bleeding  -Percocet PRN  - morphine 4 mg IV PRN severe pain (order as needed)  - will start gabapentin at low dose 100 mg PO daily given neuropathic pain.  - ortho consult  - pain management consult in AM  - PT

## 2018-11-20 NOTE — PROGRESS NOTE ADULT - PROBLEM SELECTOR PLAN 2
L. knee pain was initially main complaint, now pain is resolved in knee, but reports some loss of sensation  - treatment as above    Addendum:  Resolved on exam at 5 am L. knee pain was initially main complaint, now pain is resolved in knee, but reports some loss of sensation  - treatment as above

## 2018-11-21 VITALS
TEMPERATURE: 98 F | RESPIRATION RATE: 18 BRPM | DIASTOLIC BLOOD PRESSURE: 90 MMHG | OXYGEN SATURATION: 97 % | SYSTOLIC BLOOD PRESSURE: 149 MMHG | HEART RATE: 86 BPM

## 2018-11-21 PROCEDURE — 72131 CT LUMBAR SPINE W/O DYE: CPT

## 2018-11-21 PROCEDURE — 99285 EMERGENCY DEPT VISIT HI MDM: CPT | Mod: 25

## 2018-11-21 PROCEDURE — 73562 X-RAY EXAM OF KNEE 3: CPT

## 2018-11-21 PROCEDURE — 80053 COMPREHEN METABOLIC PANEL: CPT

## 2018-11-21 PROCEDURE — 96374 THER/PROPH/DIAG INJ IV PUSH: CPT

## 2018-11-21 PROCEDURE — 99239 HOSP IP/OBS DSCHRG MGMT >30: CPT

## 2018-11-21 PROCEDURE — 85025 COMPLETE CBC W/AUTO DIFF WBC: CPT

## 2018-11-21 PROCEDURE — 84702 CHORIONIC GONADOTROPIN TEST: CPT

## 2018-11-21 PROCEDURE — 96376 TX/PRO/DX INJ SAME DRUG ADON: CPT

## 2018-11-21 PROCEDURE — 96375 TX/PRO/DX INJ NEW DRUG ADDON: CPT

## 2018-11-21 PROCEDURE — 72148 MRI LUMBAR SPINE W/O DYE: CPT

## 2018-11-21 PROCEDURE — 97116 GAIT TRAINING THERAPY: CPT

## 2018-11-21 PROCEDURE — 81001 URINALYSIS AUTO W/SCOPE: CPT

## 2018-11-21 PROCEDURE — 97161 PT EVAL LOW COMPLEX 20 MIN: CPT

## 2018-11-21 RX ORDER — GABAPENTIN 400 MG/1
1 CAPSULE ORAL
Qty: 21 | Refills: 0 | OUTPATIENT
Start: 2018-11-21 | End: 2018-11-27

## 2018-11-21 RX ORDER — SENNA PLUS 8.6 MG/1
2 TABLET ORAL
Qty: 0 | Refills: 0 | COMMUNITY
Start: 2018-11-21

## 2018-11-21 RX ORDER — OXYCODONE HYDROCHLORIDE 5 MG/1
1 TABLET ORAL
Qty: 20 | Refills: 0 | OUTPATIENT
Start: 2018-11-21 | End: 2018-11-25

## 2018-11-21 RX ORDER — FAMOTIDINE 10 MG/ML
1 INJECTION INTRAVENOUS
Qty: 7 | Refills: 0 | OUTPATIENT
Start: 2018-11-21 | End: 2018-11-27

## 2018-11-21 RX ORDER — OXYCODONE HYDROCHLORIDE 5 MG/1
1 TABLET ORAL
Qty: 14 | Refills: 0 | OUTPATIENT
Start: 2018-11-21 | End: 2018-11-27

## 2018-11-21 RX ORDER — DOCUSATE SODIUM 100 MG
1 CAPSULE ORAL
Qty: 0 | Refills: 0 | COMMUNITY
Start: 2018-11-21

## 2018-11-21 RX ORDER — OXYCODONE HYDROCHLORIDE 5 MG/1
1 TABLET ORAL
Qty: 10 | Refills: 0 | OUTPATIENT
Start: 2018-11-21 | End: 2018-11-25

## 2018-11-21 RX ORDER — POLYETHYLENE GLYCOL 3350 17 G/17G
17 POWDER, FOR SOLUTION ORAL
Qty: 0 | Refills: 0 | COMMUNITY
Start: 2018-11-21

## 2018-11-21 RX ORDER — OXYCODONE HYDROCHLORIDE 5 MG/1
1 TABLET ORAL
Qty: 28 | Refills: 0 | OUTPATIENT
Start: 2018-11-21 | End: 2018-11-27

## 2018-11-21 RX ADMIN — Medication 650 MILLIGRAM(S): at 12:08

## 2018-11-21 RX ADMIN — Medication 100 MILLIGRAM(S): at 06:38

## 2018-11-21 RX ADMIN — Medication 100 MILLIGRAM(S): at 17:07

## 2018-11-21 RX ADMIN — Medication 650 MILLIGRAM(S): at 06:39

## 2018-11-21 RX ADMIN — OXYCODONE HYDROCHLORIDE 10 MILLIGRAM(S): 5 TABLET ORAL at 06:39

## 2018-11-21 RX ADMIN — Medication 40 MILLIGRAM(S): at 15:54

## 2018-11-21 RX ADMIN — OXYCODONE HYDROCHLORIDE 5 MILLIGRAM(S): 5 TABLET ORAL at 11:58

## 2018-11-21 RX ADMIN — POLYETHYLENE GLYCOL 3350 17 GRAM(S): 17 POWDER, FOR SOLUTION ORAL at 06:37

## 2018-11-21 RX ADMIN — POLYETHYLENE GLYCOL 3350 17 GRAM(S): 17 POWDER, FOR SOLUTION ORAL at 17:08

## 2018-11-21 RX ADMIN — Medication 650 MILLIGRAM(S): at 06:38

## 2018-11-21 RX ADMIN — OXYCODONE HYDROCHLORIDE 5 MILLIGRAM(S): 5 TABLET ORAL at 10:30

## 2018-11-21 RX ADMIN — Medication 15 MILLIGRAM(S): at 09:17

## 2018-11-21 RX ADMIN — OXYCODONE HYDROCHLORIDE 10 MILLIGRAM(S): 5 TABLET ORAL at 17:07

## 2018-11-21 RX ADMIN — FAMOTIDINE 20 MILLIGRAM(S): 10 INJECTION INTRAVENOUS at 12:08

## 2018-11-21 RX ADMIN — Medication 15 MILLIGRAM(S): at 03:18

## 2018-11-21 RX ADMIN — GABAPENTIN 300 MILLIGRAM(S): 400 CAPSULE ORAL at 13:33

## 2018-11-21 RX ADMIN — GABAPENTIN 300 MILLIGRAM(S): 400 CAPSULE ORAL at 06:38

## 2018-11-21 RX ADMIN — Medication 650 MILLIGRAM(S): at 17:07

## 2018-11-21 RX ADMIN — Medication 650 MILLIGRAM(S): at 13:22

## 2018-11-21 RX ADMIN — Medication 25 MILLIGRAM(S): at 06:38

## 2018-11-21 NOTE — PROGRESS NOTE ADULT - PROBLEM SELECTOR PLAN 5
VTE PPX: IMPROVE 1 (for immobilization), low risk, no pharm ppx.  Encourage ambulation once pain is controlled.  GI: famotidine  constipation: senna/colace/miralax
VTE PPX: IMPROVE 1 (for immobilization), low risk, no pharm ppx.  Encourage ambulation once pain is controlled.  GI: famotidine  constipation: senna/colace/miralax

## 2018-11-21 NOTE — DIETITIAN INITIAL EVALUATION ADULT. - ENERGY NEEDS
Ideal body weight used for calculations as pt >120% of IBW.   ABW 94.7kg, IBW 56kg, 167% IBW, ht 65", BMI 34.7   Nutrient needs based on [facility] standards of care for maintenance in adults.

## 2018-11-21 NOTE — DISCHARGE NOTE ADULT - PATIENT PORTAL LINK FT
You can access the [facility] Patient Portal, offered by [facility], by registering with the following website: [email or web address]

## 2018-11-21 NOTE — PROGRESS NOTE ADULT - SUBJECTIVE AND OBJECTIVE BOX
OVERNIGHT EVENTS: admitted    SUBJECTIVE: Patient reports that back pain was 2/10 this morning when not moving, but 8-9/10 upon movement. Was unable to roll over in bed. No headache, N/V, CP, SOB, abdominal pain, normal BMs and urination. Also reports some decreased sensation over her L knee.    Vital Signs Last 12 Hrs  T(F): 98.8 (11-20-18 @ 16:01), Max: 98.8 (11-20-18 @ 16:01)  HR: 80 (11-20-18 @ 16:01) (80 - 92)  BP: 150/89 (11-20-18 @ 16:01) (135/90 - 157/79)  BP(mean): --  RR: 18 (11-20-18 @ 16:01) (17 - 19)  SpO2: 98% (11-20-18 @ 16:01) (96% - 98%)  I&O's Summary      PHYSICAL EXAM:  Constitutional: NAD, comfortable in bed when lying flat.  HEENT: NC/AT, PERRLA, EOMI, no conjunctival pallor or scleral icterus, MMM  Neck: Supple, no JVD  Respiratory: Normal rate, rhythm, depth, effort. CTAB. No w/r/r.   Cardiovascular: RRR, normal S1 and S2, no m/r/g.   Gastrointestinal: +BS, soft NTND, no guarding or rebound tenderness, no palpable masses   Extremities: wwp; no cyanosis, clubbing or edema.   Vascular: Pulses equal and strong throughout.   Neurological: AAOx3, no CN deficits, pain illicited in L inguinal canal when lifting R leg to 45 degrees and L leg to 20 degrees. reflexes decreased 1+ in L patella, 2+ on R patella. Strength examination limited by pain. diminished sensation over medial aspect of L patella.  Skin: No gross skin abnormalities or rashes        LABS:                        14.5   9.2   )-----------( 237      ( 19 Nov 2018 16:10 )             42.7     11-19    140  |  103  |  15  ----------------------------<  120<H>  4.1   |  23  |  0.56    Ca    9.4      19 Nov 2018 16:10    TPro  8.2  /  Alb  4.7  /  TBili  0.6  /  DBili  x   /  AST  15  /  ALT  11  /  AlkPhos  43  11-19      Urinalysis Basic - ( 19 Nov 2018 18:11 )    Color: Yellow / Appearance: Clear / SG: >=1.030 / pH: x  Gluc: x / Ketone: 15 mg/dL  / Bili: Negative / Urobili: 0.2 E.U./dL   Blood: x / Protein: 30 mg/dL / Nitrite: NEGATIVE   Leuk Esterase: NEGATIVE / RBC: < 5 /HPF / WBC < 5 /HPF   Sq Epi: x / Non Sq Epi: x / Bacteria: Present /HPF        RADIOLOGY & ADDITIONAL TESTS:    MEDICATIONS  (STANDING):  acetaminophen   Tablet .. 650 milliGRAM(s) Oral every 6 hours  docusate sodium 100 milliGRAM(s) Oral two times a day  gabapentin 300 milliGRAM(s) Oral every 8 hours  ketorolac   Injectable 15 milliGRAM(s) IV Push every 6 hours  metoprolol succinate ER 25 milliGRAM(s) Oral daily  oxyCODONE  ER Tablet 10 milliGRAM(s) Oral every 12 hours  polyethylene glycol 3350 17 Gram(s) Oral two times a day  senna 2 Tablet(s) Oral at bedtime    MEDICATIONS  (PRN):  oxyCODONE    IR 5 milliGRAM(s) Oral every 6 hours PRN Severe Pain (7 - 10)
OVERNIGHT EVENTS: ANNA    SUBJECTIVE: Patient reports L hamstring cramping pain upon rolling over. also reports continued L knee numbness and pain. States she has improved LE mobility and strength however. Normal urination and BMs.    Vital Signs Last 12 Hrs  T(F): 98.1 (11-21-18 @ 08:29), Max: 98.7 (11-21-18 @ 05:23)  HR: 77 (11-21-18 @ 08:29) (77 - 83)  BP: 149/93 (11-21-18 @ 08:29) (149/93 - 151/96)  BP(mean): --  RR: 18 (11-21-18 @ 08:29) (18 - 18)  SpO2: 96% (11-21-18 @ 08:29) (96% - 96%)  I&O's Summary    20 Nov 2018 07:01  -  21 Nov 2018 07:00  --------------------------------------------------------  IN: 0 mL / OUT: 250 mL / NET: -250 mL        PHYSICAL EXAM:  Constitutional: NAD, comfortable in bed lying flat.  HEENT: NC/AT, PERRLA, EOMI, no conjunctival pallor or scleral icterus, MMM  Neck: Supple, no JVD  Respiratory: Normal rate, rhythm, depth, effort. CTAB. No w/r/r.   Cardiovascular: RRR, normal S1 and S2, no m/r/g.   Gastrointestinal: +BS, soft NTND, no guarding or rebound tenderness, no palpable masses   Extremities: wwp; no cyanosis, clubbing or edema.   Vascular: Pulses equal and strong throughout.   Neurological: AAOx3, no CN deficits, 4/5 strength in bilateral lower extremity proximal and distal flexors and extensors, patellar reflexes 1+ bilaterally, decreased sensation on left knee, no decreased sensation above or below the knee. able to lift both legs to 45 degrees before being limited by pain radiating to the back  Skin: No gross skin abnormalities or rashes        LABS:                        14.5   9.2   )-----------( 237      ( 19 Nov 2018 16:10 )             42.7     11-19    140  |  103  |  15  ----------------------------<  120<H>  4.1   |  23  |  0.56    Ca    9.4      19 Nov 2018 16:10    TPro  8.2  /  Alb  4.7  /  TBili  0.6  /  DBili  x   /  AST  15  /  ALT  11  /  AlkPhos  43  11-19      Urinalysis Basic - ( 19 Nov 2018 18:11 )    Color: Yellow / Appearance: Clear / SG: >=1.030 / pH: x  Gluc: x / Ketone: 15 mg/dL  / Bili: Negative / Urobili: 0.2 E.U./dL   Blood: x / Protein: 30 mg/dL / Nitrite: NEGATIVE   Leuk Esterase: NEGATIVE / RBC: < 5 /HPF / WBC < 5 /HPF   Sq Epi: x / Non Sq Epi: x / Bacteria: Present /HPF        RADIOLOGY & ADDITIONAL TESTS:    MEDICATIONS  (STANDING):  acetaminophen   Tablet .. 650 milliGRAM(s) Oral every 6 hours  docusate sodium 100 milliGRAM(s) Oral two times a day  famotidine    Tablet 20 milliGRAM(s) Oral daily  gabapentin 300 milliGRAM(s) Oral every 8 hours  ketorolac   Injectable 15 milliGRAM(s) IV Push every 6 hours  metoprolol succinate ER 25 milliGRAM(s) Oral daily  oxyCODONE  ER Tablet 10 milliGRAM(s) Oral every 12 hours  polyethylene glycol 3350 17 Gram(s) Oral two times a day  senna 2 Tablet(s) Oral at bedtime    MEDICATIONS  (PRN):  oxyCODONE    IR 5 milliGRAM(s) Oral every 6 hours PRN Severe Pain (7 - 10)

## 2018-11-21 NOTE — PROGRESS NOTE ADULT - PROBLEM SELECTOR PLAN 4
No active issues, depot shot as OP once every 3 mos.
No active issues, depot shot as OP once every 3 mos.

## 2018-11-21 NOTE — DISCHARGE NOTE ADULT - CARE PLAN
Principal Discharge DX:	Lumbar radiculopathy  Goal:	to treat your back pain  Assessment and plan of treatment:	You came in with acute worsening back pain and pain in your left leg. This was due to lumbar disc herniation. You were treated with pain medication and were evaluated by physical therapy. With good pain control you were able to walk and will be sent home with home physical therapy. You will be sent home with two types of oxycodone (extended release and immediate release). You should take the extended release every 12 hours each day. The immediate release you can take every 6 hours only as needed for severe pain. You will also be sent home with gabapentin 300mg which you should take 3 times per day. You will also be sent home with prednisone which is a steroid that will help with inflammation (you should take 40mg each morning for 4 days). You can also take ibuprofen as needed for pain. You are also being sent home with famotidine which is important to take because it protects your stomach from the acidic effects of prednisone and ibuprofen. You should also take miralax or any other laxative for prophylaxis against the constipation side effect of the oxycodone. Please do not drive while taking oxycodone.  Secondary Diagnosis:	Endometriosis  Assessment and plan of treatment:	Please continue to see your obgyn as normal.  Secondary Diagnosis:	Essential hypertension  Assessment and plan of treatment:	Please continue your home medication.

## 2018-11-21 NOTE — PROGRESS NOTE ADULT - PROBLEM SELECTOR PLAN 2
L. knee pain was initially main complaint, still reports some pain loss of sensation over knee  - treatment as above

## 2018-11-21 NOTE — DIETITIAN INITIAL EVALUATION ADULT. - OTHER INFO
48F admitted for pain management + for PT eval d/t knee pain. Per neurosurg no sx intervention needed at this time, PT recommending outpatient PT. W/u revealing herniated disc + lumbar radiculopathy. No other PMHx noted, NKFA or changes in wt PTA. No n/v/d/c, chewing/ swallowing issues noted. Pain reported thus causing decreased appetite but reports consuming ~50-75% of breakfast this morning. Encouraged intake of small meals through day to meet needs, reinforced DASH/TLC diet.

## 2018-11-21 NOTE — PROGRESS NOTE ADULT - PROBLEM SELECTOR PLAN 1
Patient with CT/MRI L spine showing disc herniation at L3-L4 and L5-S1 with compressed nerve roots at L3 and possibly at L5. Consistent with exam, with pain down anterior/medial thigh with numbness over L knee.   -Tylenol Standing  - continue toradol 15 mg IV q6 given no kidney issues or history of bleeding  - oxycodone ER 10mg ID  - oxycodone IR 5mg Q6h PRN for breakthrough pain  - gabapentin 300 mg PO Q8h given neuropathic pain.  - PT  - Neurosurg consult - signed off as no surgical intervention necessary  - famotidine 20mg QD  - senna/colace/miralax given opioids
Patient with CT/MRI L spine showing disc herniation at L3-L4 and L5-S1 with compressed nerve roots at L3 and possibly at L5. Consistent with exam, with pain down anterior/medial thigh with numbness over L knee.   - improved ROM and pain today  -Tylenol Standing  - continue toradol 15 mg IV q6 given no kidney issues or history of bleeding  - oxycodone ER 10mg ID  - oxycodone IR 5mg Q6h PRN for breakthrough pain  - gabapentin 300 mg PO Q8h given neuropathic pain.  - PT  - Neurosurg consult - signed off as no surgical intervention necessary  - famotidine 20mg QD  - continue senna/colace/miralax given opioids

## 2018-11-21 NOTE — PROGRESS NOTE ADULT - REASON FOR ADMISSION
left knee pain, left lower back pain limiting ability to ambulate
left knee pain, left lower back pain limiting ability to ambulate

## 2018-11-21 NOTE — DISCHARGE NOTE ADULT - MEDICATION SUMMARY - MEDICATIONS TO TAKE
I will START or STAY ON the medications listed below when I get home from the hospital:    predniSONE 20 mg oral tablet  -- 2 tab(s) by mouth once a day  -- Indication: For Lumbar radiculopathy    oxyCODONE 10 mg oral tablet, extended release  -- 1 tab(s) by mouth every 12 hours MDD:20  -- Indication: For Lumbar radiculopathy    oxyCODONE 5 mg oral tablet  -- 1 tab(s) by mouth every 6 hours, As needed, Severe Pain (7 - 10) MDD:40  -- Indication: For Lumbar radiculopathy    gabapentin 300 mg oral capsule  -- 1 cap(s) by mouth every 8 hours  -- Indication: For Lumbar radiculopathy    Depo-Provera 400 mg/mL intramuscular suspension  -- 1 each intramuscular every 3 months  -- Indication: For Endometriosis    Metoprolol Succinate ER 25 mg oral tablet, extended release  -- 1 tab(s) by mouth once a day  -- Indication: For hypertension    famotidine 20 mg oral tablet  -- 1 tab(s) by mouth once a day  -- Indication: For gastrointestinal prophylaxis    docusate sodium 100 mg oral capsule  -- 1 cap(s) by mouth 2 times a day  -- Indication: For constipation    polyethylene glycol 3350 oral powder for reconstitution  -- 17 gram(s) by mouth 2 times a day  -- Indication: For constipation    senna oral tablet  -- 2 tab(s) by mouth once a day (at bedtime)  -- Indication: For constipation

## 2018-11-21 NOTE — DISCHARGE NOTE ADULT - HOSPITAL COURSE
47 yo F w/PMH endometriosis, HTN presents to ED complaining of left knee pain and lower back pain found to have disc herniation at L3-L4 and L5-S1 with compressed nerve roots at L3 and possibly at L5. Admitted for pain management in the setting of radiculopathy due to herniated discs. She was treated with aggressive pain management regimen including oxycodone, gabapentin, steroids, and toradol. Her pain and strength improved and she was evaluated by PT and determined she can go home with home PT. She will be sent home with a week's worth of pain medications and will call Dr. rosas for a follow up appointment next week.

## 2018-11-21 NOTE — DISCHARGE NOTE ADULT - CARE PROVIDER_API CALL
Kirill Ambrocio), Internal Medicine  [address] [place] [id number]  Phone: [phone]  Fax: [phone]

## 2018-11-21 NOTE — PROGRESS NOTE ADULT - ASSESSMENT
49 yo F w/PMH endometriosis, HTN presents to ED complaining of left knee pain and lower back pain found to have disc herniation at L3-L4 and L5-S1 with compressed nerve roots at L3 and possibly at L5. Admitted for pain management in the setting of radiculopathy due to herniated discs.

## 2018-11-21 NOTE — PROGRESS NOTE ADULT - PROBLEM SELECTOR PLAN 7
1) PCP Contacted on Admission: (Y/N) --> Name & Phone #: Kirill Ambrocio [phone]  2) Date of Contact with PCP: 11/21  3) PCP Contacted at Discharge: (Y/N, N/A)  4) Summary of Handoff Given to PCP: Discussed reason for admission, pain management, possible interventions including flexeril and steroids, importance of PT.   5) Post-Discharge Appointment Date and Location:
1) PCP Contacted on Admission: (Y/N) --> Name & Phone #:  2) Date of Contact with PCP:  3) PCP Contacted at Discharge: (Y/N, N/A)  4) Summary of Handoff Given to PCP:   5) Post-Discharge Appointment Date and Location:

## 2018-11-21 NOTE — DIETITIAN INITIAL EVALUATION ADULT. - PROBLEM SELECTOR PLAN 1
Patient with CT/MRI L spine showing disc herniation at L3-L4 and L5-S1 with compressed nerve roots at L3 and possibly at L5. Consistent with exam, with pain down anterior thigh, worse at the knee.   -Tylenol Standing  - toradol 15 mg IV q6 given no kidney issues or history of bleeding  -Percocet PRN  - morphine 4 mg IV PRN severe pain (order as needed)  - will start gabapentin at low dose 100 mg PO daily given neuropathic pain.  - ortho consult  - pain management consult in AM  - PT

## 2018-11-21 NOTE — DIETITIAN INITIAL EVALUATION ADULT. - NS AS NUTRI INTERV ED CONTENT
Purpose of the nutrition education/Nutrition relationship to health/disease/discussed DASH/TLC diet w pt, encouraged continuation of good PO intake to meet needs

## 2018-11-21 NOTE — DISCHARGE NOTE ADULT - PLAN OF CARE
to treat your back pain You came in with acute worsening back pain and pain in your left leg. This was due to lumbar disc herniation. You were treated with pain medication and were evaluated by physical therapy. With good pain control you were able to walk and will be sent home with home physical therapy. You will be sent home with two types of oxycodone (extended release and immediate release). You should take the extended release every 12 hours each day. The immediate release you can take every 6 hours only as needed for severe pain. You will also be sent home with gabapentin 300mg which you should take 3 times per day. You will also be sent home with prednisone which is a steroid that will help with inflammation (you should take 40mg each morning for 4 days). You can also take ibuprofen as needed for pain. You are also being sent home with famotidine which is important to take because it protects your stomach from the acidic effects of prednisone and ibuprofen. You should also take miralax or any other laxative for prophylaxis against the constipation side effect of the oxycodone. Please do not drive while taking oxycodone. Please continue to see your obgyn as normal. Please continue your home medication.

## 2018-11-21 NOTE — PROGRESS NOTE ADULT - PROBLEM SELECTOR PLAN 3
BP elevated 183/68 on admission,  Likely elevated BP in the setting of severe pain.   - pain management as above  - SBPs 150s today  - if still elevated when pain is controlled consider adding another agent  - c/w toprol XL 25 mg PO daily
BP elevated 183/68 on admission,  Likely elevated BP in the setting of severe pain.   - pain management as above  - SBPs 150s now  - if still elevated when pain is controlled consider adding another agent  - c/w toprol XL 25 mg PO daily

## 2018-11-21 NOTE — DIETITIAN INITIAL EVALUATION ADULT. - PROBLEM SELECTOR PLAN 2
L. knee pain is main complaint. Tender on exam, which should not be due to radiculopathy. XRAY reportedly unremarkable per radiology resident read. NO inciting trauma, unclear if pain is related to radiculopathy or other etiology.   - ortho consult in AM.    Addendum:  Resolved on exam at 5 am

## 2018-11-26 DIAGNOSIS — I10 ESSENTIAL (PRIMARY) HYPERTENSION: ICD-10-CM

## 2018-11-26 DIAGNOSIS — M51.17 INTERVERTEBRAL DISC DISORDERS WITH RADICULOPATHY, LUMBOSACRAL REGION: ICD-10-CM

## 2018-11-26 DIAGNOSIS — M25.562 PAIN IN LEFT KNEE: ICD-10-CM

## 2020-11-25 NOTE — ED PROVIDER NOTE - PROGRESS NOTE DETAILS
pt still having pain, unable to ambulate. CT shows disc herniations impinging on nerve roots c/w with pain. Will get MRI and admit for pain control/PT eval
No significant past surgical history

## 2021-02-11 RX ORDER — MEDROXYPROGESTERONE ACETATE 150 MG/ML
150 INJECTION, SUSPENSION INTRAMUSCULAR
COMMUNITY

## 2021-02-11 RX ORDER — METOPROLOL SUCCINATE 25 MG/1
25 TABLET, EXTENDED RELEASE ORAL DAILY
COMMUNITY
Start: 2006-08-31 | End: 2021-02-12 | Stop reason: SDUPTHER

## 2021-02-11 NOTE — PROGRESS NOTES
Trevon Aguiar 1970 female MRN: [id number]      ASSESSMENT/PLAN  Problem List Items Addressed This Visit        Cardiovascular and Mediastinum    Essential hypertension    Relevant Medications    metoprolol succinate (TOPROL-XL) 25 mg 24 hr tablet      Other Visit Diagnoses     Healthcare maintenance    -  Primary    Screening for diabetes mellitus        Relevant Orders    Comprehensive metabolic panel    Screening, lipid        Relevant Orders    Lipid panel    Screening for HIV (human immunodeficiency virus)        Relevant Orders    HIV 1/2 Antigen/Antibody (4th Generation) w Reflex [facility]    Screen for colon cancer        Relevant Orders    Cologuard    Encounter for screening mammogram for malignant neoplasm of breast        Encounter for immunization        Relevant Orders    TDAP VACCINE GREATER THAN OR EQUAL TO 8YO IM        HTN: Within goal, continue current regimen  CMP + Lipids to screen for HLD, DM  HIV screening ordered, pt agreeable   Pap UTD -- will request records  Mammogram DUE -- encouraged to schedule (has script from gynecology)  CRC DUE -- pt willing to complete Cologuard   Encouraged regular eye and dental exams     BMI Counseling: Body mass index is 33 05 kg/m²  The BMI is above normal  Nutrition recommendations include decreasing overall calorie intake  Doing Whole 30  No future appointments  SUBJECTIVE  CC: Establish Care (Patient seen in office today for a new patient to establish care - moved to [place] recently and needs her yearly PE and refill BP medication)      HPI:  Val Lawson is a 48 y o  female who presents to establish care  History reviewed and updated as below  HTN: At time of diagnosis, BP was quite high  Was previously on three agents  Has since lost weight and lead to reduction in medications     Review of Systems   Constitutional: Positive for fatigue (thinks may be due to work)  Negative for unexpected weight change     HENT: Negative for congestion, ear pain, rhinorrhea and sore throat  Intermittent allergies   Eyes: Negative for visual disturbance  Respiratory: Negative for cough and shortness of breath  Cardiovascular: Negative for chest pain, palpitations and leg swelling  Gastrointestinal: Negative for abdominal pain, constipation and diarrhea  Endocrine: Negative for polyuria  Genitourinary: Negative for dysuria and menstrual problem (once every 3 months, light)  Neurological: Negative for dizziness and headaches  Psychiatric/Behavioral: Negative for sleep disturbance         Historical Information   The patient history was reviewed and updated as follows:    Past Medical History:   Diagnosis Date    Lumbar herniated disc      Past Surgical History:   Procedure Laterality Date    OOPHORECTOMY      Ruptured     Family History   Problem Relation Age of Onset    No Known Problems Mother     Hypertension Father     Valvular heart disease Father     Alcohol abuse Maternal Aunt       Social History   Social History     Substance and Sexual Activity   Alcohol Use Yes    Frequency: Monthly or less    Drinks per session: 1 or 2     Social History     Substance and Sexual Activity   Drug Use Never     Social History     Tobacco Use   Smoking Status Never Smoker   Smokeless Tobacco Never Used       Medications:     Current Outpatient Medications:     medroxyPROGESTERone (DEPO-PROVERA) 150 mg/mL injection, Inject 150 mg into a muscle every 3 (three) months , Disp: , Rfl:     metoprolol succinate (TOPROL-XL) 25 mg 24 hr tablet, Take 1 tablet (25 mg total) by mouth daily, Disp: 90 tablet, Rfl: 3  Allergies   Allergen Reactions    Loratadine Other (See Comments)     HYPER       OBJECTIVE    Vitals:   Vitals:    02/12/21 0804 02/12/21 0824   BP: 136/82 132/78   BP Location: Left arm    Patient Position: Sitting    Cuff Size: Standard    Pulse: 67    Temp: 98 2 °F (36 8 °C)    SpO2: 98%    Weight: 90 1 kg (198 lb 9 6 oz)    Height: 5' 5" (1 651 m)            Physical Exam  Vitals signs and nursing note reviewed  Constitutional:       General: She is not in acute distress  Appearance: Normal appearance  HENT:      Head: Normocephalic and atraumatic  Right Ear: Tympanic membrane and ear canal normal       Left Ear: Tympanic membrane and ear canal normal       Nose: Nose normal       Mouth/Throat:      Mouth: Mucous membranes are moist       Pharynx: No oropharyngeal exudate or posterior oropharyngeal erythema  Eyes:      Conjunctiva/sclera: Conjunctivae normal    Cardiovascular:      Rate and Rhythm: Normal rate and regular rhythm  Pulmonary:      Effort: Pulmonary effort is normal  No respiratory distress  Breath sounds: Normal breath sounds  Abdominal:      General: Bowel sounds are normal  There is no distension  Palpations: Abdomen is soft  Tenderness: There is no abdominal tenderness  Musculoskeletal:      Right lower leg: No edema  Left lower leg: No edema  Lymphadenopathy:      Cervical: No cervical adenopathy  Skin:     General: Skin is warm and dry  Neurological:      General: No focal deficit present  Mental Status: She is alert     Psychiatric:         Mood and Affect: Mood normal                     Aminata Bhatia DO  [facility]   2/12/2021  8:27 AM

## 2021-02-12 ENCOUNTER — OFFICE VISIT (OUTPATIENT)
Dept: FAMILY MEDICINE CLINIC | Facility: CLINIC | Age: 51
End: 2021-02-12
Payer: COMMERCIAL

## 2021-02-12 ENCOUNTER — TELEPHONE (OUTPATIENT)
Dept: ADMINISTRATIVE | Facility: OTHER | Age: 51
End: 2021-02-12

## 2021-02-12 ENCOUNTER — LAB (OUTPATIENT)
Dept: LAB | Facility: CLINIC | Age: 51
End: 2021-02-12
Payer: COMMERCIAL

## 2021-02-12 VITALS
WEIGHT: 198.6 LBS | HEIGHT: 65 IN | BODY MASS INDEX: 33.09 KG/M2 | HEART RATE: 67 BPM | OXYGEN SATURATION: 98 % | TEMPERATURE: 98.2 F | SYSTOLIC BLOOD PRESSURE: 132 MMHG | DIASTOLIC BLOOD PRESSURE: 78 MMHG

## 2021-02-12 DIAGNOSIS — Z13.220 SCREENING, LIPID: ICD-10-CM

## 2021-02-12 DIAGNOSIS — Z12.31 ENCOUNTER FOR SCREENING MAMMOGRAM FOR MALIGNANT NEOPLASM OF BREAST: ICD-10-CM

## 2021-02-12 DIAGNOSIS — Z11.4 SCREENING FOR HIV (HUMAN IMMUNODEFICIENCY VIRUS): ICD-10-CM

## 2021-02-12 DIAGNOSIS — Z23 ENCOUNTER FOR IMMUNIZATION: ICD-10-CM

## 2021-02-12 DIAGNOSIS — I10 ESSENTIAL HYPERTENSION: ICD-10-CM

## 2021-02-12 DIAGNOSIS — Z12.11 SCREEN FOR COLON CANCER: ICD-10-CM

## 2021-02-12 DIAGNOSIS — Z13.1 SCREENING FOR DIABETES MELLITUS: ICD-10-CM

## 2021-02-12 DIAGNOSIS — Z00.00 HEALTHCARE MAINTENANCE: Primary | ICD-10-CM

## 2021-02-12 LAB
ALBUMIN SERPL BCP-MCNC: 4.2 G/DL (ref 3.5–5)
ALP SERPL-CCNC: 40 U/L (ref 46–116)
ALT SERPL W P-5'-P-CCNC: 18 U/L (ref 12–78)
ANION GAP SERPL CALCULATED.3IONS-SCNC: 7 MMOL/L (ref 4–13)
AST SERPL W P-5'-P-CCNC: 11 U/L (ref 5–45)
BILIRUB SERPL-MCNC: 0.57 MG/DL (ref 0.2–1)
BUN SERPL-MCNC: 14 MG/DL (ref 5–25)
CALCIUM SERPL-MCNC: 9.5 MG/DL (ref 8.3–10.1)
CHLORIDE SERPL-SCNC: 105 MMOL/L (ref 100–108)
CHOLEST SERPL-MCNC: 190 MG/DL (ref 50–200)
CO2 SERPL-SCNC: 27 MMOL/L (ref 21–32)
CREAT SERPL-MCNC: 0.76 MG/DL (ref 0.6–1.3)
GFR SERPL CREATININE-BSD FRML MDRD: 92 ML/MIN/1.73SQ M
GLUCOSE P FAST SERPL-MCNC: 93 MG/DL (ref 65–99)
HDLC SERPL-MCNC: 35 MG/DL
LDLC SERPL CALC-MCNC: 144 MG/DL (ref 0–100)
NONHDLC SERPL-MCNC: 155 MG/DL
POTASSIUM SERPL-SCNC: 3.9 MMOL/L (ref 3.5–5.3)
PROT SERPL-MCNC: 7.5 G/DL (ref 6.4–8.2)
SODIUM SERPL-SCNC: 139 MMOL/L (ref 136–145)
TRIGL SERPL-MCNC: 54 MG/DL

## 2021-02-12 PROCEDURE — 99386 PREV VISIT NEW AGE 40-64: CPT | Performed by: FAMILY MEDICINE

## 2021-02-12 PROCEDURE — 87389 HIV-1 AG W/HIV-1&-2 AB AG IA: CPT

## 2021-02-12 PROCEDURE — 3008F BODY MASS INDEX DOCD: CPT | Performed by: FAMILY MEDICINE

## 2021-02-12 PROCEDURE — 90715 TDAP VACCINE 7 YRS/> IM: CPT

## 2021-02-12 PROCEDURE — 36415 COLL VENOUS BLD VENIPUNCTURE: CPT

## 2021-02-12 PROCEDURE — 90471 IMMUNIZATION ADMIN: CPT

## 2021-02-12 PROCEDURE — 1036F TOBACCO NON-USER: CPT | Performed by: FAMILY MEDICINE

## 2021-02-12 PROCEDURE — 3725F SCREEN DEPRESSION PERFORMED: CPT | Performed by: FAMILY MEDICINE

## 2021-02-12 PROCEDURE — 80061 LIPID PANEL: CPT

## 2021-02-12 PROCEDURE — 80053 COMPREHEN METABOLIC PANEL: CPT

## 2021-02-12 RX ORDER — METOPROLOL SUCCINATE 25 MG/1
25 TABLET, EXTENDED RELEASE ORAL DAILY
Qty: 90 TABLET | Refills: 3 | Status: SHIPPED | OUTPATIENT
Start: 2021-02-12 | End: 2022-01-19

## 2021-02-12 NOTE — TELEPHONE ENCOUNTER
Upon review of the In Basket request we were able to locate, review, and update the patient chart as requested for DEXA Scan and Mammogram     Any additional questions or concerns should be emailed to the Practice Liaisons via [email or web address] email, please do not reply via In [employer]      Thank you  Daron Singh

## 2021-02-12 NOTE — TELEPHONE ENCOUNTER
----- Message from Lj Su MA sent at 2/12/2021  8:50 AM EST -----  Regarding: Oly Diaz / Debby Reid  02/12/21 8:51 AM    Hello, our patient Maia Costa has had DEXA Scan and Mammogram completed/performed  Please assist in updating the patient chart by pulling the Care Everywhere (CE) document  The date of service is 12/10/2019       Thank you,  JORGE LUIS Davenport PG

## 2021-02-15 LAB — HIV 1+2 AB+HIV1 P24 AG SERPL QL IA: NORMAL

## 2021-06-13 NOTE — ED ADULT NURSE NOTE - NSHISCREENINGQ1_ED_A_ED
[facility] Medicine Services  PROGRESS NOTE    Patient Name: Augusto Lorenzana Jr.  : 1947  MRN: [id number]    Date of Admission: 2021  Primary Care Physician: Rob Polanco MD    Subjective   Subjective     CC:  F/U med mgmt     HPI:  Patient seen and examined.  Nursing notes reviewed.  Patient agitated overnight.  He did receive Haldol this morning.  He remains confused. Falls asleep mid conversation.    ROS:  Unable to obtain reliable review of systems    Objective   Objective     Vital Signs:   Temp:  [97.3 °F (36.3 °C)-98.7 °F (37.1 °C)] 97.8 °F (36.6 °C)  Heart Rate:  [61-84] 65  Resp:  [18-20] 18  BP: (117-163)/(65-88) 117/65        Physical Exam:  Constitutional: No acute distress, awake, alert  HENT: NCAT, mucous membranes dry, Keofeed in place  Respiratory: Clear to auscultation bilaterally, respiratory effort normal   Cardiovascular: RRR, no murmurs, rubs, or gallops  Gastrointestinal: Positive bowel sounds, soft, nontender, nondistended  Musculoskeletal: No bilateral ankle edema  Psychiatric: Appropriate affect, cooperative  Neurologic: Confused, no focal deficits   Skin: No rashes    Results Reviewed:  Results from last 7 days   Lab Units 21  0401 21  0401 06/10/21  0351   WBC 10*3/mm3 9.85 12.73* 14.77*   HEMOGLOBIN g/dL 8.4* 8.7* 8.5*   HEMATOCRIT % 26.3* 27.2* 26.3*   PLATELETS 10*3/mm3 260 268 241     Results from last 7 days   Lab Units 21  0401 21  0401 06/10/21  0351 21  1013 21  0340 21  0301   SODIUM mmol/L 135* 136 137 136 134* 134*   POTASSIUM mmol/L 3.4* 3.6 3.6 3.5 3.9 3.9   CHLORIDE mmol/L 100 100 100 100 99 98   CO2 mmol/L 26.0 27.0 27.0 25.0 27.0 26.0   BUN mg/dL 67* 64* 60* 46* 56* 84*   CREATININE mg/dL 1.69* 1.77* 1.79* 1.71* 1.98* 2.92*   GLUCOSE mg/dL 116* 144* 103* 112* 94 106*   CALCIUM mg/dL 9.0 9.1 8.6 8.2* 8.5* 8.8   ALT (SGPT) U/L  --   --   --  21 21 23   AST (SGOT) U/L  --   --   --  24 24 21      Estimated Creatinine Clearance: 39.2 mL/min (A) (by C-G formula based on SCr of 1.69 mg/dL (H)).    Microbiology Results Abnormal     Procedure Component Value - Date/Time    Blood Culture - Blood, Arm, Right [[id number]] Collected: 06/05/21 1412    Lab Status: Final result Specimen: Blood from Arm, Right Updated: 06/10/21 1431     Blood Culture No growth at 5 days    Blood Culture - Blood, Cannula [[id number]] Collected: 06/05/21 1201    Lab Status: Final result Specimen: Blood from Cannula Updated: 06/10/21 1245     Blood Culture No growth at 5 days    Eosinophil Smear - Urine, Urine, Clean Catch [[id number]]  (Normal) Collected: 05/30/21 1416    Lab Status: Final result Specimen: Urine, Clean Catch Updated: 05/30/21 1614     Eosinophil Smear 0 % EOS/100 Cells     Narrative:      No eosinophil seen    Eosinophil Smear - Urine, Urine, Clean Catch [[id number]]  (Normal) Collected: 05/23/21 1838    Lab Status: Final result Specimen: Urine, Clean Catch Updated: 05/24/21 0459     Eosinophil Smear 0 % EOS/100 Cells     Narrative:      No eosinophil seen    COVID PRE-OP / PRE-PROCEDURE SCREENING ORDER (NO ISOLATION) - Swab, Nasopharynx [[id number]]  (Normal) Collected: 05/22/21 0626    Lab Status: Final result Specimen: Swab from Nasopharynx Updated: 05/22/21 0702    Narrative:      The following orders were created for panel order COVID PRE-OP / PRE-PROCEDURE SCREENING ORDER (NO ISOLATION) - Swab, Nasopharynx.  Procedure                               Abnormality         Status                     ---------                               -----------         ------                     COVID-19 and FLU A/B PCR...[[id number]]  Normal              Final result                 Please view results for these tests on the individual orders.    COVID-19 and FLU A/B PCR - Swab, Nasopharynx [[id number]]  (Normal) Collected: 05/22/21 0626    Lab Status: Final result Specimen: Swab from Nasopharynx Updated: 05/22/21 0702     COVID19 Not  Detected     Influenza A PCR Not Detected     Influenza B PCR Not Detected          Imaging Results (Last 24 Hours)     ** No results found for the last 24 hours. **          Results for orders placed during the hospital encounter of 05/22/21    Adult Transthoracic Echo Complete w/ Color, Spectral and Contrast if necessary per protocol    Interpretation Summary  · Left ventricular systolic function is normal. Estimated left ventricular EF = 60%.  · Left ventricular diastolic function is consistent with (grade I) impaired relaxation.  · Mild aortic valve stenosis is present.  · Estimated right ventricular systolic pressure from tricuspid regurgitation is normal (<35 mmHg).      I have reviewed the medications:  Scheduled Meds:amLODIPine, 10 mg, Nasogastric, Q24H  aspirin, 325 mg, Nasogastric, Daily  atorvastatin, 40 mg, Nasogastric, Nightly  Beneprotein, 1 packet, Nasogastric, 4x Daily  carvedilol, 25 mg, Nasogastric, BID With Meals  sennosides, 10 mL, Nasogastric, BID   And  docusate sodium, 100 mg, Nasogastric, BID  gabapentin, 300 mg, Nasogastric, Nightly  heparin (porcine), 5,000 Units, Subcutaneous, Q12H  insulin detemir, 15 Units, Subcutaneous, Nightly  insulin lispro, 0-9 Units, Subcutaneous, TID AC  isosorbide dinitrate, 10 mg, Nasogastric, TID - Nitrates  melatonin, 5 mg, Nasogastric, Nightly  pantoprazole, 40 mg, Intravenous, BID  pharmacy consult - MTM, , Does not apply, Daily  Poly-Vitamin/Iron, 1 mL, Oral, BID  sodium chloride, 10 mL, Intravenous, Q12H  thiamine, 100 mg, Nasogastric, Daily      Continuous Infusions:   PRN Meds:.•  acetaminophen **OR** acetaminophen **OR** acetaminophen  •  albumin human  •  albumin human  •  albumin human  •  [DISCONTINUED] senna-docusate sodium **AND** polyethylene glycol **AND** [DISCONTINUED] bisacodyl **AND** bisacodyl  •  dextrose  •  guaiFENesin-dextromethorphan  •  haloperidol lactate  •  heparin (porcine)  •  ipratropium-albuterol  •  magnesium sulfate **OR**  magnesium sulfate in D5W 1g/100mL (PREMIX) **OR** magnesium sulfate  •  naloxone  •  ondansetron    Assessment/Plan   Assessment & Plan     Active Hospital Problems    Diagnosis  POA   • **NSTEMI 5/22/2021 [I21.4]  Yes   • Postop encephalopathy post code. Combination of anoxic insult and azotemia [G93.40]  No   • Perioperative cardiac arrest with ventricular fibrillation (CMS/formerly [facility]) [I46.9, I49.01]  Yes   • S/P CABG x 3 on 5/28/21 [Z95.1]  Not Applicable   • Gout [M10.9]  Yes   • Former smokeless tobacco use [Z87.891]  Not Applicable   • A/C kidney disease. ATN due to postoperative arrest. Dialysis begun 6/3/2021 [N18.9]  Yes   • T2DM on oral agents and insulin. HbA1c 7.4  [E11.9, Z79.4]  Not Applicable   • Hyperlipidemia LDL goal <70 [E78.5]  Yes   • Essential hypertension [I10]  Yes      Resolved Hospital Problems    Diagnosis Date Resolved POA   • Postoperative hypoxemia [R09.02, Z98.890] 06/04/2021 No   • Leukocytosis [D72.829] 05/22/2021 Yes   • Acute CHF (CMS/HCC) [I50.9] 05/22/2021 Yes   • MICHAEL (acute kidney injury) (CMS/formerly [facility]) [N17.9] 05/27/2021 Yes   • Non-STEMI (non-ST elevated myocardial infarction) (CMS/HCC) [I21.4] 05/22/2021 Yes   • Non-STEMI (non-ST elevated myocardial infarction) (CMS/HCC) [I21.4] 05/24/2021 Yes        Brief Hospital Course to date:  Augusto Lorenzana Jr. is a 74 y.o. male with a known history of coronary artery disease, previous stent to his RCA in 2009, diabetes, hypertension, dyslipidemia, gout, chronic kidney disease, smokeless tobacco use who presented to the emergency room May 22 complaining of shortness of air and found to have a non-ST elevation MI.  Heart catheterization revealed multivessel disease.  He underwent CABG x3 vessels on May 28.  Course was complicated by ventricular fibrillation and asystole requiring resuscitation x2 on the day of surgery.  He subsequently underwent paced rhythm for about 48 hours before resuming sinus rhythm.  He required 3 units of blood, 4 units of  FFP and 1 sixpack of platelets for bleeding.  He was extubated on the 29th but developed progressive renal failure and azotemia requiring hemodialysis.  Lower extremity duplex was performed and was negative for DVT.  Renal function started to recover and he underwent some diuresis.  He was also noted to be encephalopathic with no acute stroke.  Mentation also started to improve.  He has a recent history of colonoscopy with polypectomy.  Failed fees evaluation on 6/11. transferred to [facility] from ICU on 6/12.     This patient's problems and plans were partially entered by my partner and updated as appropriate by me 06/13/21.  All problems are new to me today     CABG x3  Evelin-operative cardiac arrest with ventricular arrhythmia  -5/28/2021 per Dr. Miller  -Patient with some perioperative V. Fib  -He has had no further ventricular arrhythmias  -Intraoperative echocardiogram revealed moderate aortic valve stenosis    Diabetes mellitus type 2  -With hypoglycemia overnight, required an amp of D50  -DC Levemir  -Continue moderate dose sliding scale    Hypertension  -BP stable, continue Norvasc, Coreg, Isordil    Acute on chronic kidney disease  -She did require dialysis after surgery  -Creatinine improving  -Nephrology following    Postop encephalopathy  -Patient confused this morning, received Haldol  -Continue as needed Haldol for agitation  -Repeat EKG is  on 5/30    Severe pharyngeal dysphagia  -SLP following, possible repeat swallow evaluation tomorrow  -Continue Keofeed    DVT prophylaxis:  Medical and mechanical DVT prophylaxis orders are present.       Disposition: I expect the patient to be discharged TBD. Likely needs rehab. CM to follow.     CODE STATUS:   Code Status and Medical Interventions:   Ordered at: 05/22/21 0639     Code Status:    CPR     Medical Interventions (Level of Support Prior to Arrest):    Full       Nighat Estrada,   06/13/21               No

## 2021-11-08 ENCOUNTER — RA CDI HCC (OUTPATIENT)
Dept: OTHER | Facility: HOSPITAL | Age: 51
End: 2021-11-08

## 2021-11-12 ENCOUNTER — OFFICE VISIT (OUTPATIENT)
Dept: FAMILY MEDICINE CLINIC | Facility: CLINIC | Age: 51
End: 2021-11-12
Payer: COMMERCIAL

## 2021-11-12 VITALS
HEIGHT: 65 IN | OXYGEN SATURATION: 99 % | HEART RATE: 91 BPM | WEIGHT: 199 LBS | TEMPERATURE: 98.4 F | BODY MASS INDEX: 33.15 KG/M2 | DIASTOLIC BLOOD PRESSURE: 78 MMHG | SYSTOLIC BLOOD PRESSURE: 152 MMHG

## 2021-11-12 DIAGNOSIS — I10 ESSENTIAL HYPERTENSION: ICD-10-CM

## 2021-11-12 DIAGNOSIS — Z12.11 SCREENING FOR COLON CANCER: ICD-10-CM

## 2021-11-12 DIAGNOSIS — R60.0 LOWER EXTREMITY EDEMA: Primary | ICD-10-CM

## 2021-11-12 DIAGNOSIS — R06.02 SHORTNESS OF BREATH: ICD-10-CM

## 2021-11-12 PROCEDURE — 3008F BODY MASS INDEX DOCD: CPT | Performed by: FAMILY MEDICINE

## 2021-11-12 PROCEDURE — 1036F TOBACCO NON-USER: CPT | Performed by: FAMILY MEDICINE

## 2021-11-12 PROCEDURE — 3077F SYST BP >= 140 MM HG: CPT | Performed by: FAMILY MEDICINE

## 2021-11-12 PROCEDURE — 3725F SCREEN DEPRESSION PERFORMED: CPT | Performed by: FAMILY MEDICINE

## 2021-11-12 PROCEDURE — 99214 OFFICE O/P EST MOD 30 MIN: CPT | Performed by: FAMILY MEDICINE

## 2021-11-12 PROCEDURE — 3078F DIAST BP <80 MM HG: CPT | Performed by: FAMILY MEDICINE

## 2021-11-12 PROCEDURE — 93000 ELECTROCARDIOGRAM COMPLETE: CPT | Performed by: FAMILY MEDICINE

## 2021-11-12 RX ORDER — HYDROCHLOROTHIAZIDE 12.5 MG/1
12.5 TABLET ORAL DAILY
Qty: 30 TABLET | Refills: 1 | Status: SHIPPED | OUTPATIENT
Start: 2021-11-12 | End: 2022-01-04 | Stop reason: SDUPTHER

## 2021-12-03 ENCOUNTER — LAB (OUTPATIENT)
Dept: LAB | Facility: CLINIC | Age: 51
End: 2021-12-03
Payer: COMMERCIAL

## 2021-12-03 ENCOUNTER — TELEPHONE (OUTPATIENT)
Dept: OTHER | Facility: OTHER | Age: 51
End: 2021-12-03

## 2021-12-03 DIAGNOSIS — R60.0 LOWER EXTREMITY EDEMA: ICD-10-CM

## 2021-12-03 DIAGNOSIS — E05.90 HYPERTHYROIDISM: Primary | ICD-10-CM

## 2021-12-03 DIAGNOSIS — R06.02 SHORTNESS OF BREATH: ICD-10-CM

## 2021-12-03 LAB
ALBUMIN SERPL BCP-MCNC: 3.6 G/DL (ref 3.5–5)
ALP SERPL-CCNC: 55 U/L (ref 46–116)
ALT SERPL W P-5'-P-CCNC: 43 U/L (ref 12–78)
ANION GAP SERPL CALCULATED.3IONS-SCNC: 8 MMOL/L (ref 4–13)
AST SERPL W P-5'-P-CCNC: 23 U/L (ref 5–45)
BASOPHILS # BLD AUTO: 0.01 THOUSANDS/ΜL (ref 0–0.1)
BASOPHILS NFR BLD AUTO: 0 % (ref 0–1)
BILIRUB SERPL-MCNC: 0.77 MG/DL (ref 0.2–1)
BUN SERPL-MCNC: 14 MG/DL (ref 5–25)
CALCIUM SERPL-MCNC: 10.4 MG/DL (ref 8.3–10.1)
CHLORIDE SERPL-SCNC: 108 MMOL/L (ref 100–108)
CO2 SERPL-SCNC: 24 MMOL/L (ref 21–32)
CREAT SERPL-MCNC: 0.57 MG/DL (ref 0.6–1.3)
EOSINOPHIL # BLD AUTO: 0 THOUSAND/ΜL (ref 0–0.61)
EOSINOPHIL NFR BLD AUTO: 0 % (ref 0–6)
ERYTHROCYTE [DISTWIDTH] IN BLOOD BY AUTOMATED COUNT: 12.5 % (ref 11.6–15.1)
GFR SERPL CREATININE-BSD FRML MDRD: 108 ML/MIN/1.73SQ M
GLUCOSE P FAST SERPL-MCNC: 102 MG/DL (ref 65–99)
HCT VFR BLD AUTO: 38.5 % (ref 34.8–46.1)
HGB BLD-MCNC: 12.6 G/DL (ref 11.5–15.4)
IMM GRANULOCYTES # BLD AUTO: 0.01 THOUSAND/UL (ref 0–0.2)
IMM GRANULOCYTES NFR BLD AUTO: 0 % (ref 0–2)
LYMPHOCYTES # BLD AUTO: 1.29 THOUSANDS/ΜL (ref 0.6–4.47)
LYMPHOCYTES NFR BLD AUTO: 40 % (ref 14–44)
MCH RBC QN AUTO: 29.4 PG (ref 26.8–34.3)
MCHC RBC AUTO-ENTMCNC: 32.7 G/DL (ref 31.4–37.4)
MCV RBC AUTO: 90 FL (ref 82–98)
MONOCYTES # BLD AUTO: 0.39 THOUSAND/ΜL (ref 0.17–1.22)
MONOCYTES NFR BLD AUTO: 12 % (ref 4–12)
NEUTROPHILS # BLD AUTO: 1.54 THOUSANDS/ΜL (ref 1.85–7.62)
NEUTS SEG NFR BLD AUTO: 48 % (ref 43–75)
NRBC BLD AUTO-RTO: 0 /100 WBCS
PLATELET # BLD AUTO: 216 THOUSANDS/UL (ref 149–390)
PMV BLD AUTO: 11.8 FL (ref 8.9–12.7)
POTASSIUM SERPL-SCNC: 3.9 MMOL/L (ref 3.5–5.3)
PROT SERPL-MCNC: 7.3 G/DL (ref 6.4–8.2)
RBC # BLD AUTO: 4.29 MILLION/UL (ref 3.81–5.12)
SODIUM SERPL-SCNC: 140 MMOL/L (ref 136–145)
T4 FREE SERPL-MCNC: 4.28 NG/DL (ref 0.76–1.46)
TSH SERPL DL<=0.05 MIU/L-ACNC: <0.007 UIU/ML (ref 0.36–3.74)
WBC # BLD AUTO: 3.24 THOUSAND/UL (ref 4.31–10.16)

## 2021-12-03 PROCEDURE — 36415 COLL VENOUS BLD VENIPUNCTURE: CPT

## 2021-12-03 PROCEDURE — 84443 ASSAY THYROID STIM HORMONE: CPT

## 2021-12-03 PROCEDURE — 85025 COMPLETE CBC W/AUTO DIFF WBC: CPT

## 2021-12-03 PROCEDURE — 84439 ASSAY OF FREE THYROXINE: CPT

## 2021-12-03 PROCEDURE — 80053 COMPREHEN METABOLIC PANEL: CPT

## 2021-12-04 ENCOUNTER — TELEPHONE (OUTPATIENT)
Dept: FAMILY MEDICINE CLINIC | Facility: CLINIC | Age: 51
End: 2021-12-04

## 2021-12-16 ENCOUNTER — HOSPITAL ENCOUNTER (OUTPATIENT)
Dept: ULTRASOUND IMAGING | Facility: HOSPITAL | Age: 51
Discharge: HOME | End: 2021-12-16
Payer: COMMERCIAL

## 2021-12-16 ENCOUNTER — HOSPITAL ENCOUNTER (OUTPATIENT)
Dept: NON INVASIVE DIAGNOSTICS | Facility: CLINIC | Age: 51
Discharge: HOME | End: 2021-12-16
Payer: COMMERCIAL

## 2021-12-16 VITALS
BODY MASS INDEX: 33.15 KG/M2 | HEIGHT: 65 IN | DIASTOLIC BLOOD PRESSURE: 78 MMHG | WEIGHT: 199 LBS | SYSTOLIC BLOOD PRESSURE: 152 MMHG | HEART RATE: 91 BPM

## 2021-12-16 DIAGNOSIS — E05.90 HYPERTHYROIDISM: ICD-10-CM

## 2021-12-16 DIAGNOSIS — R06.02 SHORTNESS OF BREATH: ICD-10-CM

## 2021-12-16 DIAGNOSIS — R60.0 LOWER EXTREMITY EDEMA: ICD-10-CM

## 2021-12-16 LAB
AORTIC ROOT: 2.9 CM
AORTIC VALVE MEAN VELOCITY: 14.2 M/S
APICAL FOUR CHAMBER EJECTION FRACTION: 72 %
AV LVOT PEAK GRADIENT: 11 MMHG
AV MEAN GRADIENT: 9 MMHG
AV PEAK GRADIENT: 16 MMHG
DOP CALC AO VTI: 40.02 CM
E WAVE DECELERATION TIME: 169 MS
FRACTIONAL SHORTENING: 38 % (ref 28–44)
INTERVENTRICULAR SEPTUM IN DIASTOLE (PARASTERNAL SHORT AXIS VIEW): 0.7 CM
LEFT ATRIUM AREA SYSTOLE SINGLE PLANE A4C: 19.1 CM2
LEFT INTERNAL DIMENSION IN SYSTOLE: 2.9 CM (ref 2.1–4)
LEFT VENTRICULAR INTERNAL DIMENSION IN DIASTOLE: 4.7 CM (ref 5.16–7.68)
LEFT VENTRICULAR POSTERIOR WALL IN END DIASTOLE: 0.8 CM
LEFT VENTRICULAR STROKE VOLUME: 70 ML
MITRAL REGURGITATION PEAK VELOCITY: 5.45 M/S
MITRAL VALVE REGURGITANT PEAK GRADIENT: 119 MMHG
MV E'TISSUE VEL-LAT: 14 CM/S
MV PEAK A VEL: 0.73 M/S
MV PEAK E VEL: 94 CM/S
MV STENOSIS PRESSURE HALF TIME: 0 MS
RIGHT ATRIUM AREA SYSTOLE A4C: 12.4 CM2
RIGHT VENTRICLE ID DIMENSION: 3.3 CM
SL CV PED ECHO LEFT VENTRICLE DIASTOLIC VOLUME (MOD BIPLANE) 2D: 101 ML
SL CV PED ECHO LEFT VENTRICLE SYSTOLIC VOLUME (MOD BIPLANE) 2D: 31 ML
TRICUSPID VALVE PEAK REGURGITATION VELOCITY: 3.22 M/S
TRICUSPID VALVE S': 0.9 CM/S
TV PEAK GRADIENT: 41 MMHG
Z-SCORE OF LEFT VENTRICULAR DIMENSION IN END SYSTOLE: -2.92

## 2021-12-16 PROCEDURE — 76536 US EXAM OF HEAD AND NECK: CPT

## 2021-12-16 PROCEDURE — 93306 TTE W/DOPPLER COMPLETE: CPT | Performed by: INTERNAL MEDICINE

## 2021-12-16 PROCEDURE — 93306 TTE W/DOPPLER COMPLETE: CPT

## 2021-12-18 ENCOUNTER — APPOINTMENT (OUTPATIENT)
Dept: LAB | Facility: HOSPITAL | Age: 51
End: 2021-12-18
Payer: COMMERCIAL

## 2021-12-18 DIAGNOSIS — E05.90 HYPERTHYROIDISM: ICD-10-CM

## 2021-12-18 PROCEDURE — 86376 MICROSOMAL ANTIBODY EACH: CPT

## 2021-12-18 PROCEDURE — 36415 COLL VENOUS BLD VENIPUNCTURE: CPT

## 2021-12-18 PROCEDURE — 86800 THYROGLOBULIN ANTIBODY: CPT

## 2021-12-21 LAB
THYROGLOB AB SERPL-ACNC: 4.5 IU/ML (ref 0–0.9)
THYROPEROXIDASE AB SERPL-ACNC: 187 IU/ML (ref 0–34)

## 2022-01-05 ENCOUNTER — TELEPHONE (OUTPATIENT)
Dept: ENDOCRINOLOGY | Facility: CLINIC | Age: 52
End: 2022-01-05

## 2022-01-05 NOTE — TELEPHONE ENCOUNTER
As a new patient she should be seen in person, can she reschedule?    If need be, she can be an add-on for my schedule for next Thursday 1/13 if no other openings in the next month

## 2022-01-13 ENCOUNTER — CONSULT (OUTPATIENT)
Dept: ENDOCRINOLOGY | Facility: CLINIC | Age: 52
End: 2022-01-13
Payer: COMMERCIAL

## 2022-01-13 VITALS
DIASTOLIC BLOOD PRESSURE: 80 MMHG | WEIGHT: 196 LBS | BODY MASS INDEX: 32.65 KG/M2 | HEIGHT: 65 IN | SYSTOLIC BLOOD PRESSURE: 150 MMHG | HEART RATE: 90 BPM

## 2022-01-13 DIAGNOSIS — E05.90 HYPERTHYROIDISM: ICD-10-CM

## 2022-01-13 PROCEDURE — 99204 OFFICE O/P NEW MOD 45 MIN: CPT | Performed by: INTERNAL MEDICINE

## 2022-01-13 PROCEDURE — 3008F BODY MASS INDEX DOCD: CPT | Performed by: INTERNAL MEDICINE

## 2022-01-13 PROCEDURE — 3077F SYST BP >= 140 MM HG: CPT | Performed by: INTERNAL MEDICINE

## 2022-01-13 PROCEDURE — 1036F TOBACCO NON-USER: CPT | Performed by: INTERNAL MEDICINE

## 2022-01-13 PROCEDURE — 3079F DIAST BP 80-89 MM HG: CPT | Performed by: INTERNAL MEDICINE

## 2022-01-13 NOTE — PROGRESS NOTES
New Patient Endocrinology Progress Note    Referring Provider  Libby Stubbs, [address] [facility],  [place] [address]     CC: hyperthyroidism    History of Present Illness:   Izabela Byrd is a 46 y o  female who is presenting as a new patient to endocrinology for the evaluation of hyperthyroidism at the request of primary care provider Dr Christine Calabrese  At PCP office visit in 11/2021, she was noted to have LE edema, elevated blood pressure, palpitations, diarrhea, tremors in hands and legs, SOB,Labwork was ordered and echocardiogram was performed  Labwork showed decreased TSH <0 007 and Free T4 4 28  Thyroid antibodies were checked - microsomal and thyroglobulin antibodies were elevated  She was started on 5mg TID methimazole  Echocardiogram was normal and US thyroid was ordered which showed enlarged hypervascular heterogenous thyroid; no nodules were noted  Has been taking methimazole 5mg TID for three weeks  She still feels occasional tremors in hands and in lower extremities but very mild and improved  Pt still reports more frequent bowel movements than previously, currently having 1 5BMS per day (previously was 1BM daily ) Has not noticed recent palpitations in last one week  She reports received covid vaccine in 9/12/2021 and 10/10/2021 and started having symptoms within 1 week of covid vaccine  Pt reports at home she was weighing 202lbs and right before New years at home weighed 186lbs  She does feel that her clothes are looser than previously  Still having occasional periods but less frequent while on Depo, 2x a year, has been on Depoprovera since age 45  Pt has reported night sweats every night, sometimes heat intolerance during the daytime, ongoing for about 3 years but worsening over last few months  Other medical conditions: HTN, endometriosis, on depoprovera  Family medical history: no thyroid conditions, no thyroid cancer     Mother medical hx - none  Father - heart conditions, has needed a valve replacement  Patient Active Problem List   Diagnosis    Endometriosis    Essential hypertension      Past Medical History:   Diagnosis Date    Lumbar herniated disc       Past Surgical History:   Procedure Laterality Date    OOPHORECTOMY      Ruptured      Family History   Problem Relation Age of Onset    No Known Problems Mother     Hypertension Father     Valvular heart disease Father     Alcohol abuse Maternal Aunt      Social History     Tobacco Use    Smoking status: Never Smoker    Smokeless tobacco: Never Used   Substance Use Topics    Alcohol use: Yes     Alcohol/week: 4 0 standard drinks     Types: 3 Glasses of wine, 1 Standard drinks or equivalent per week     Allergies   Allergen Reactions    Loratadine Other (See Comments)     HYPER         Current Outpatient Medications:     hydrochlorothiazide (HYDRODIURIL) 12 5 mg tablet, Take 1 tablet (12 5 mg total) by mouth daily, Disp: 30 tablet, Rfl: 1    medroxyPROGESTERone (DEPO-PROVERA) 150 mg/mL injection, Inject 150 mg into a muscle every 3 (three) months , Disp: , Rfl:     methimazole (TAPAZOLE) 5 mg tablet, Take 1 tablet (5 mg total) by mouth 3 (three) times a day, Disp: 90 tablet, Rfl: 1    metoprolol succinate (TOPROL-XL) 25 mg 24 hr tablet, Take 1 tablet (25 mg total) by mouth daily, Disp: 90 tablet, Rfl: 3  Review of Systems   Constitutional: Negative for chills (night sweats) and unexpected weight change  HENT: Negative for trouble swallowing and voice change  Eyes: Negative for visual disturbance  Respiratory: Negative for shortness of breath (gets winded with exertion)  Cardiovascular: Negative for palpitations  Gastrointestinal: Negative for constipation and diarrhea  Endocrine: Positive for heat intolerance  Skin:        Denies hair or skin changes   Neurological: Negative for dizziness and light-headedness  Psychiatric/Behavioral: Positive for sleep disturbance (sleep has worsened  )     All other systems reviewed and are negative  Physical Exam:  Body mass index is 32 62 kg/m²  /80 (BP Location: Left arm, Patient Position: Sitting, Cuff Size: Large)   Pulse 90   Ht 5' 5" (1 651 m)   Wt 88 9 kg (196 lb)   BMI 32 62 kg/m²    Wt Readings from Last 3 Encounters:   01/13/22 88 9 kg (196 lb)   12/16/21 90 3 kg (199 lb)   11/12/21 90 3 kg (199 lb)       Physical Exam  Constitutional:       Appearance: She is well-developed  HENT:      Head: Normocephalic and atraumatic  Eyes:      General:         Right eye: No discharge  Left eye: No discharge  Neck:      Thyroid: Thyromegaly present  No thyroid mass or thyroid tenderness  Cardiovascular:      Rate and Rhythm: Normal rate and regular rhythm  Heart sounds: Normal heart sounds  No murmur heard  No friction rub  No gallop  Pulmonary:      Effort: Pulmonary effort is normal  No respiratory distress  Breath sounds: Normal breath sounds  No wheezing or rales  Chest:      Chest wall: No tenderness  Abdominal:      General: Bowel sounds are normal  There is no distension  Palpations: Abdomen is soft  There is no mass  Tenderness: There is no abdominal tenderness  Musculoskeletal:         General: Normal range of motion  Cervical back: Normal range of motion and neck supple  Comments: Mild lower extremity edema, L ankle worse than R    Skin:     General: Skin is warm and dry  Neurological:      Mental Status: She is alert and oriented to person, place, and time     Psychiatric:         Behavior: Behavior normal          Labs:   No results found for: HGBA1C    Lab Results   Component Value Date    [id number] <0 007 (L) 12/03/2021       Lab Results   Component Value Date    CREATININE 0 57 (L) 12/03/2021    CREATININE 0 76 02/12/2021    BUN 14 12/03/2021    K 3 9 12/03/2021     12/03/2021    CO2 24 12/03/2021     eGFR   Date Value Ref Range Status   12/03/2021 108 ml/min/1 73sq m Final No components found for: [facility]    Lab Results   Component Value Date    HDL 35 (L) 02/12/2021    TRIG 54 02/12/2021       Lab Results   Component Value Date    ALT 43 12/03/2021    AST 23 12/03/2021    ALKPHOS 55 12/03/2021 12/09/2019     Impression:  1  Hyperthyroidism         Plan:  Diagnoses and all orders for this visit:    Hyperthyroidism  -     Ambulatory referral to Endocrinology  Check TSI, recheck TSH, Free T4, T3 now  Unclear if this is Graves disease vs hashitoxicosis  We discussed long term course in both of these situations as well as potential side effects of methimazole that she would need to call us about including symptoms of liver failure like jaundice, skin rashes, sore throat/fever  Continue 15mg methimazole daily, pt can take methimazole all at once 15mg instead of 5mg TID  Follow up in 3 months  Discussed with the patient and all questioned fully answered  She will call me if any problems arise

## 2022-01-15 ENCOUNTER — APPOINTMENT (OUTPATIENT)
Dept: LAB | Facility: HOSPITAL | Age: 52
End: 2022-01-15
Payer: COMMERCIAL

## 2022-01-15 DIAGNOSIS — E05.90 HYPERTHYROIDISM: ICD-10-CM

## 2022-01-15 LAB
T3 SERPL-MCNC: 2.4 NG/ML (ref 0.6–1.8)
T4 FREE SERPL-MCNC: 1.93 NG/DL (ref 0.76–1.46)
TSH SERPL DL<=0.05 MIU/L-ACNC: <0.007 UIU/ML (ref 0.36–3.74)

## 2022-01-15 PROCEDURE — 84480 ASSAY TRIIODOTHYRONINE (T3): CPT

## 2022-01-15 PROCEDURE — 84445 ASSAY OF TSI GLOBULIN: CPT

## 2022-01-15 PROCEDURE — 36415 COLL VENOUS BLD VENIPUNCTURE: CPT

## 2022-01-15 PROCEDURE — 84443 ASSAY THYROID STIM HORMONE: CPT

## 2022-01-15 PROCEDURE — 84439 ASSAY OF FREE THYROXINE: CPT

## 2022-01-19 DIAGNOSIS — I10 ESSENTIAL HYPERTENSION: ICD-10-CM

## 2022-01-19 RX ORDER — METOPROLOL SUCCINATE 25 MG/1
TABLET, EXTENDED RELEASE ORAL
Qty: 90 TABLET | Refills: 3 | Status: SHIPPED | OUTPATIENT
Start: 2022-01-19 | End: 2022-07-18 | Stop reason: SDUPTHER

## 2022-01-20 LAB — TSI SER-ACNC: 8.54 IU/L (ref 0–0.55)

## 2022-02-21 ENCOUNTER — OFFICE VISIT (OUTPATIENT)
Dept: FAMILY MEDICINE CLINIC | Facility: CLINIC | Age: 52
End: 2022-02-21
Payer: COMMERCIAL

## 2022-02-21 VITALS
TEMPERATURE: 97.9 F | WEIGHT: 199 LBS | OXYGEN SATURATION: 96 % | BODY MASS INDEX: 33.15 KG/M2 | DIASTOLIC BLOOD PRESSURE: 88 MMHG | SYSTOLIC BLOOD PRESSURE: 132 MMHG | HEIGHT: 65 IN | HEART RATE: 83 BPM

## 2022-02-21 DIAGNOSIS — Z12.11 SCREENING FOR COLON CANCER: ICD-10-CM

## 2022-02-21 DIAGNOSIS — E53.8 B12 DEFICIENCY: ICD-10-CM

## 2022-02-21 DIAGNOSIS — M25.532 LEFT WRIST PAIN: ICD-10-CM

## 2022-02-21 DIAGNOSIS — E05.90 HYPERTHYROIDISM: ICD-10-CM

## 2022-02-21 DIAGNOSIS — I10 ESSENTIAL HYPERTENSION: Primary | ICD-10-CM

## 2022-02-21 DIAGNOSIS — R79.0 ABNORMAL SERUM IRON LEVEL: ICD-10-CM

## 2022-02-21 PROCEDURE — 99214 OFFICE O/P EST MOD 30 MIN: CPT | Performed by: FAMILY MEDICINE

## 2022-02-21 PROCEDURE — 1036F TOBACCO NON-USER: CPT | Performed by: FAMILY MEDICINE

## 2022-02-21 PROCEDURE — 3079F DIAST BP 80-89 MM HG: CPT | Performed by: FAMILY MEDICINE

## 2022-02-21 PROCEDURE — 3075F SYST BP GE 130 - 139MM HG: CPT | Performed by: FAMILY MEDICINE

## 2022-02-21 PROCEDURE — 3008F BODY MASS INDEX DOCD: CPT | Performed by: FAMILY MEDICINE

## 2022-02-21 RX ORDER — HYDROCHLOROTHIAZIDE 12.5 MG/1
12.5 TABLET ORAL DAILY
Qty: 90 TABLET | Refills: 1 | Status: SHIPPED | OUTPATIENT
Start: 2022-02-21 | End: 2022-07-18 | Stop reason: SDUPTHER

## 2022-02-21 NOTE — PROGRESS NOTES
Delmus Schwab Stasia Beech 1970 female MRN: [id number]      ASSESSMENT/PLAN  Problem List Items Addressed This Visit        Endocrine    Hyperthyroidism       Cardiovascular and Mediastinum    Essential hypertension - Primary    Relevant Medications    hydrochlorothiazide (HYDRODIURIL) 12 5 mg tablet       Other    Abnormal serum iron level    Relevant Orders    Iron Panel (Includes Ferritin, Iron Sat%, Iron, and TIBC)    B12 deficiency    Relevant Orders    Vitamin B12      Other Visit Diagnoses     Left wrist pain        Screening for colon cancer        Relevant Orders    Cologuard        BP within goal in office and at her gynecology appointment last week  Encouraged intermittent home monitoring and f/u if persistently elevated  Continue current regimen  Hyperthyroidism: F/u with Endo as scheduled     Wrist Pain: Possible OA  If worsening/persistent, consider referral to Ortho Hand  Pap UTD -- will request records   Mammogram DUE -- has script, needs to schedule   CRC willing to complete Cologuard   Vaccinations: TDap UTD, Shingles -- is planning to get, Flu deferred, COVID UTD  Encouraged regular physical activity, varied diet, and regular dental/eye exams       Future Appointments   Date Time Provider Yordy Olsen   4/14/2022  9:40 AM Dolores Mason DO [facility]          SUBJECTIVE  CC: Hypertension      HPI:  Soheila Resendizchester is a 46 y o  female who presents due to elevated BP  Recently diagnosed with hyperthyroidism -- currently undergoing tx with Endo  Has not been checking at home  At Endo 150/80, at Gyn 130/80   Has been having more headaches -- frontal  Otherwise ROS as benign  Pt also notes that she was previously told she has high iron and a low B12  She would like to recheck these  She also reports pain in her L wrist  Initially thought she sprained it, but it has been persistent for a few months  No known injury prior to onset   Wonders if this is related to her hyperthyroidism, as she read it can cause joint pain  Does note stiffness is worse in AM      Review of Systems   Eyes: Negative for visual disturbance  Respiratory: Negative for shortness of breath  Cardiovascular: Negative for chest pain, palpitations and leg swelling  Musculoskeletal: Positive for arthralgias (L wrist pain -- has been present for a few months)  Neurological: Positive for headaches  Negative for dizziness         Historical Information   The patient history was reviewed and updated as follows:    Past Medical History:   Diagnosis Date    Lumbar herniated disc      Past Surgical History:   Procedure Laterality Date    OOPHORECTOMY      Ruptured     Family History   Problem Relation Age of Onset    No Known Problems Mother     Hypertension Father     Valvular heart disease Father     Alcohol abuse Maternal Aunt       Social History   Social History     Substance and Sexual Activity   Alcohol Use Yes    Alcohol/week: 4 0 standard drinks    Types: 3 Glasses of wine, 1 Standard drinks or equivalent per week     Social History     Substance and Sexual Activity   Drug Use Never     Social History     Tobacco Use   Smoking Status Never Smoker   Smokeless Tobacco Never Used       Medications:     Current Outpatient Medications:     hydrochlorothiazide (HYDRODIURIL) 12 5 mg tablet, Take 1 tablet (12 5 mg total) by mouth daily, Disp: 90 tablet, Rfl: 1    medroxyPROGESTERone (DEPO-PROVERA) 150 mg/mL injection, Inject 150 mg into a muscle every 3 (three) months , Disp: , Rfl:     methimazole (TAPAZOLE) 10 mg tablet, Take 3 tablets (30 mg total) by mouth in the morning, Disp: 90 tablet, Rfl: 3    metoprolol succinate (TOPROL-XL) 25 mg 24 hr tablet, TAKE 1 TABLET DAILY, Disp: 90 tablet, Rfl: 3  Allergies   Allergen Reactions    Loratadine Other (See Comments)     HYPER       OBJECTIVE    Vitals:   Vitals:    02/21/22 1115 02/21/22 1129   BP: 148/87 132/88   Pulse: 83 Temp: 97 9 °F (36 6 °C)    SpO2: 96%    Weight: 90 3 kg (199 lb)    Height: 5' 5" (1 651 m)            Physical Exam  Vitals and nursing note reviewed  Constitutional:       General: She is not in acute distress  Appearance: Normal appearance  HENT:      Head: Normocephalic and atraumatic  Pulmonary:      Effort: Pulmonary effort is normal  No respiratory distress  Neurological:      General: No focal deficit present  Mental Status: She is alert     Psychiatric:         Mood and Affect: Mood normal                     Sweetie Humphreys DO  [facility]   2/21/2022  12:23 PM

## 2022-03-05 ENCOUNTER — APPOINTMENT (OUTPATIENT)
Dept: LAB | Facility: HOSPITAL | Age: 52
End: 2022-03-05
Payer: COMMERCIAL

## 2022-03-05 DIAGNOSIS — E05.90 HYPERTHYROIDISM: ICD-10-CM

## 2022-03-05 DIAGNOSIS — E53.8 B12 DEFICIENCY: ICD-10-CM

## 2022-03-05 DIAGNOSIS — R79.0 ABNORMAL SERUM IRON LEVEL: ICD-10-CM

## 2022-03-05 LAB — TSH SERPL DL<=0.05 MIU/L-ACNC: <0.007 UIU/ML (ref 0.36–3.74)

## 2022-03-05 PROCEDURE — 36415 COLL VENOUS BLD VENIPUNCTURE: CPT

## 2022-03-05 PROCEDURE — 82607 VITAMIN B-12: CPT

## 2022-03-05 PROCEDURE — 84480 ASSAY TRIIODOTHYRONINE (T3): CPT

## 2022-03-05 PROCEDURE — 82728 ASSAY OF FERRITIN: CPT

## 2022-03-05 PROCEDURE — 83550 IRON BINDING TEST: CPT

## 2022-03-05 PROCEDURE — 83540 ASSAY OF IRON: CPT

## 2022-03-05 PROCEDURE — 84443 ASSAY THYROID STIM HORMONE: CPT

## 2022-03-05 PROCEDURE — 84439 ASSAY OF FREE THYROXINE: CPT

## 2022-03-06 LAB
FERRITIN SERPL-MCNC: 191 NG/ML (ref 8–388)
IRON SATN MFR SERPL: 44 % (ref 15–50)
IRON SERPL-MCNC: 143 UG/DL (ref 50–170)
T3 SERPL-MCNC: 0.5 NG/ML (ref 0.6–1.8)
T4 FREE SERPL-MCNC: 0.62 NG/DL (ref 0.76–1.46)
TIBC SERPL-MCNC: 325 UG/DL (ref 250–450)
VIT B12 SERPL-MCNC: 272 PG/ML (ref 100–900)

## 2022-03-16 DIAGNOSIS — E05.90 HYPERTHYROIDISM: ICD-10-CM

## 2022-03-16 RX ORDER — METHIMAZOLE 10 MG/1
10 TABLET ORAL DAILY
Qty: 90 TABLET | Refills: 3
Start: 2022-03-16 | End: 2022-04-14

## 2022-04-09 ENCOUNTER — APPOINTMENT (OUTPATIENT)
Dept: LAB | Facility: HOSPITAL | Age: 52
End: 2022-04-09
Payer: COMMERCIAL

## 2022-04-09 DIAGNOSIS — E05.90 HYPERTHYROIDISM: ICD-10-CM

## 2022-04-09 LAB
T4 FREE SERPL-MCNC: 0.74 NG/DL (ref 0.76–1.46)
TSH SERPL DL<=0.05 MIU/L-ACNC: 1.24 UIU/ML (ref 0.45–4.5)

## 2022-04-09 PROCEDURE — 84443 ASSAY THYROID STIM HORMONE: CPT

## 2022-04-09 PROCEDURE — 36415 COLL VENOUS BLD VENIPUNCTURE: CPT

## 2022-04-09 PROCEDURE — 84439 ASSAY OF FREE THYROXINE: CPT

## 2022-04-14 ENCOUNTER — OFFICE VISIT (OUTPATIENT)
Dept: ENDOCRINOLOGY | Facility: CLINIC | Age: 52
End: 2022-04-14
Payer: COMMERCIAL

## 2022-04-14 VITALS
HEIGHT: 65 IN | WEIGHT: 210.2 LBS | HEART RATE: 68 BPM | SYSTOLIC BLOOD PRESSURE: 130 MMHG | DIASTOLIC BLOOD PRESSURE: 100 MMHG | BODY MASS INDEX: 35.02 KG/M2

## 2022-04-14 DIAGNOSIS — E05.00 GRAVES DISEASE: Primary | ICD-10-CM

## 2022-04-14 PROBLEM — E05.90 HYPERTHYROIDISM: Status: RESOLVED | Noted: 2021-10-01 | Resolved: 2022-04-14

## 2022-04-14 PROCEDURE — 3080F DIAST BP >= 90 MM HG: CPT | Performed by: INTERNAL MEDICINE

## 2022-04-14 PROCEDURE — 99214 OFFICE O/P EST MOD 30 MIN: CPT | Performed by: INTERNAL MEDICINE

## 2022-04-14 PROCEDURE — 1036F TOBACCO NON-USER: CPT | Performed by: INTERNAL MEDICINE

## 2022-04-14 PROCEDURE — 3008F BODY MASS INDEX DOCD: CPT | Performed by: INTERNAL MEDICINE

## 2022-04-14 RX ORDER — METHIMAZOLE 5 MG/1
5 TABLET ORAL DAILY
Qty: 90 TABLET | Refills: 3 | Status: SHIPPED | OUTPATIENT
Start: 2022-05-26 | End: 2022-07-21

## 2022-04-14 RX ORDER — UBIDECARENONE 75 MG
CAPSULE ORAL DAILY
COMMUNITY

## 2022-04-14 NOTE — PROGRESS NOTES
Established Patient Progress Note    CC: graves disease followup    History of Present Illness:   47 yo F presenting for Graves disease followup  She was started on methimazole in 12/2021  She had established care with us in January 2022 and dosage was increased from 15mg methimazole to 30mg daily  Most recently she has been taking 10mg methimazole daily  She reports overall improvement in her initial symptoms  Reports less heat intolerance, noticing night sweat every 2-3 weeks (improved), denies SOB  Pt does report hair loss over last two months and joint pain in left wrist worst in the morning  She denies palpitations, tremors, diarrhea or eye symptoms  Patient Active Problem List   Diagnosis    Endometriosis    Essential hypertension    Abnormal serum iron level    B12 deficiency    Graves disease     Past Medical History:   Diagnosis Date    Lumbar herniated disc       Past Surgical History:   Procedure Laterality Date    OOPHORECTOMY      Ruptured      Family History   Problem Relation Age of Onset    No Known Problems Mother     Hypertension Father     Valvular heart disease Father     Alcohol abuse Maternal Aunt      Social History     Tobacco Use    Smoking status: Never Smoker    Smokeless tobacco: Never Used   Substance Use Topics    Alcohol use: Yes     Alcohol/week: 4 0 standard drinks     Types: 3 Glasses of wine, 1 Standard drinks or equivalent per week     Allergies   Allergen Reactions    Loratadine Other (See Comments)     HYPER       Review of Systems   Constitutional: Positive for unexpected weight change (10-15lbs gain)  Negative for chills  HENT: Negative for trouble swallowing and voice change  Respiratory: Negative for shortness of breath  Cardiovascular: Negative for palpitations  Gastrointestinal: Negative for constipation and diarrhea  Endocrine: Negative for cold intolerance (feels slightly colder than she previously had been) and heat intolerance     Skin: Hair loss over last 2 months   Neurological: Negative for tremors  Current Outpatient Medications:     cyanocobalamin (VITAMIN B-12) 100 mcg tablet, Take by mouth daily, Disp: , Rfl:     hydrochlorothiazide (HYDRODIURIL) 12 5 mg tablet, Take 1 tablet (12 5 mg total) by mouth daily, Disp: 90 tablet, Rfl: 1    medroxyPROGESTERone (DEPO-PROVERA) 150 mg/mL injection, Inject 150 mg into a muscle every 3 (three) months , Disp: , Rfl:     metoprolol succinate (TOPROL-XL) 25 mg 24 hr tablet, TAKE 1 TABLET DAILY, Disp: 90 tablet, Rfl: 3    [START ON 5/26/2022] methimazole (TAPAZOLE) 5 mg tablet, Take 1 tablet (5 mg total) by mouth in the morning, Disp: 90 tablet, Rfl: 3    Physical Exam:  Body mass index is 34 98 kg/m²  /100   Pulse 68   Ht 5' 5" (1 651 m)   Wt 95 3 kg (210 lb 3 2 oz)   BMI 34 98 kg/m²      Physical Exam  Vitals reviewed  Constitutional:       Appearance: She is well-developed  HENT:      Head: Normocephalic and atraumatic  Eyes:      General:         Right eye: No discharge  Left eye: No discharge  Comments: No lid lag   Neck:      Thyroid: Thyromegaly present  Cardiovascular:      Rate and Rhythm: Normal rate and regular rhythm  Heart sounds: Normal heart sounds  No murmur heard  No friction rub  No gallop  Pulmonary:      Effort: Pulmonary effort is normal  No respiratory distress  Breath sounds: Normal breath sounds  No wheezing or rales  Chest:      Chest wall: No tenderness  Abdominal:      General: Bowel sounds are normal  There is no distension  Palpations: Abdomen is soft  There is no mass  Tenderness: There is no abdominal tenderness  Musculoskeletal:         General: Normal range of motion  Cervical back: Normal range of motion and neck supple  Skin:     General: Skin is warm and dry  Neurological:      Mental Status: She is alert and oriented to person, place, and time     Psychiatric:         Behavior: Behavior normal        Labs:     Lab Results   Component Value Date    TEN4IFWWGFPV 1 236 04/09/2022    H1YZQXC 0 50 (L) 03/05/2022       Lab Results   Component Value Date    CREATININE 0 57 (L) 12/03/2021    CREATININE 0 76 02/12/2021    BUN 14 12/03/2021    K 3 9 12/03/2021     12/03/2021    CO2 24 12/03/2021     eGFR   Date Value Ref Range Status   12/03/2021 108 ml/min/1 73sq m Final       Lab Results   Component Value Date    ALT 43 12/03/2021    AST 23 12/03/2021    ALKPHOS 55 12/03/2021       Lab Results   Component Value Date    CHOLESTEROL 190 02/12/2021     Lab Results   Component Value Date    HDL 35 (L) 02/12/2021     Lab Results   Component Value Date    TRIG 54 02/12/2021     Lab Results   Component Value Date    NONHDLC 155 02/12/2021     Impression:  1  Graves disease       Plan:    Diagnoses and all orders for this visit:    Graves disease  -     methimazole (TAPAZOLE) 5 mg tablet; Take 1 tablet (5 mg total) by mouth in the morning  -     TSH, 3rd generation Lab Collect; Future  -     T4, free Lab Collect; Future  -     Thyroid stimulating immunoglobulin Lab Collect; Future  -     T3 Lab Collect; Future  Trend TSI, recheck labs in 6 weeks  Decrease methimazole to 5mg daily from 10mg daily given TSH 1 236 and T4 1 31  Follow up in 3 months  I have spent 30 minutes with patient today in which greater than 50% of this time was spent in counseling/coordination of care  All questioned fully answered  She will call me if any problems arise  Educated/ Counseled patient on diagnostic test results, prognosis, risk vs benefit of treatment options, importance of treatment compliance, healthy life and lifestyle choices

## 2022-04-21 ENCOUNTER — TELEPHONE (OUTPATIENT)
Dept: ADMINISTRATIVE | Facility: OTHER | Age: 52
End: 2022-04-21

## 2022-04-21 NOTE — TELEPHONE ENCOUNTER
----- Message from Edith Nava sent at 4/21/2022  8:25 AM EDT -----  Regarding: Mora Larger Request  04/21/22 8:25 AM    Hello, our patient Jonny Michelle has had Mammogram completed/performed  Please assist in updating the patient chart by pulling the Care Everywhere (CE) document  The date of service is 03/18/2022       Thank you,  Edith VIZCAINO

## 2022-04-26 NOTE — TELEPHONE ENCOUNTER
Upon review of the In Basket request we were able to locate, review, and update the patient chart as requested for Mammogram     Any additional questions or concerns should be emailed to the Practice Liaisons via [email or web address] email, please do not reply via In Basket      Thank you  Gely Castro

## 2022-06-25 ENCOUNTER — LAB (OUTPATIENT)
Dept: LAB | Facility: HOSPITAL | Age: 52
End: 2022-06-25
Payer: COMMERCIAL

## 2022-06-25 DIAGNOSIS — E05.00 GRAVES DISEASE: ICD-10-CM

## 2022-06-25 LAB — TSH SERPL DL<=0.05 MIU/L-ACNC: 1.63 UIU/ML (ref 0.45–4.5)

## 2022-06-25 PROCEDURE — 84443 ASSAY THYROID STIM HORMONE: CPT

## 2022-06-25 PROCEDURE — 84445 ASSAY OF TSI GLOBULIN: CPT

## 2022-06-25 PROCEDURE — 36415 COLL VENOUS BLD VENIPUNCTURE: CPT

## 2022-06-25 PROCEDURE — 84439 ASSAY OF FREE THYROXINE: CPT

## 2022-06-25 PROCEDURE — 84480 ASSAY TRIIODOTHYRONINE (T3): CPT

## 2022-06-26 LAB
T3 SERPL-MCNC: 0.6 NG/ML (ref 0.6–1.8)
T4 FREE SERPL-MCNC: 0.85 NG/DL (ref 0.76–1.46)

## 2022-06-28 LAB — TSI SER-ACNC: 2.55 IU/L (ref 0–0.55)

## 2022-07-18 DIAGNOSIS — I10 ESSENTIAL HYPERTENSION: ICD-10-CM

## 2022-07-18 RX ORDER — METOPROLOL SUCCINATE 25 MG/1
25 TABLET, EXTENDED RELEASE ORAL DAILY
Qty: 90 TABLET | Refills: 3 | Status: SHIPPED | OUTPATIENT
Start: 2022-07-18

## 2022-07-18 RX ORDER — HYDROCHLOROTHIAZIDE 12.5 MG/1
12.5 TABLET ORAL DAILY
Qty: 90 TABLET | Refills: 1 | Status: SHIPPED | OUTPATIENT
Start: 2022-07-18

## 2022-07-21 ENCOUNTER — OFFICE VISIT (OUTPATIENT)
Dept: ENDOCRINOLOGY | Facility: CLINIC | Age: 52
End: 2022-07-21
Payer: COMMERCIAL

## 2022-07-21 VITALS
WEIGHT: 217 LBS | SYSTOLIC BLOOD PRESSURE: 142 MMHG | BODY MASS INDEX: 36.15 KG/M2 | HEART RATE: 65 BPM | HEIGHT: 65 IN | DIASTOLIC BLOOD PRESSURE: 88 MMHG

## 2022-07-21 DIAGNOSIS — E05.00 GRAVES DISEASE: Primary | ICD-10-CM

## 2022-07-21 PROCEDURE — 99214 OFFICE O/P EST MOD 30 MIN: CPT | Performed by: INTERNAL MEDICINE

## 2022-07-21 PROCEDURE — 3077F SYST BP >= 140 MM HG: CPT | Performed by: INTERNAL MEDICINE

## 2022-07-21 PROCEDURE — 3079F DIAST BP 80-89 MM HG: CPT | Performed by: INTERNAL MEDICINE

## 2022-07-21 RX ORDER — METHIMAZOLE 5 MG/1
2.5 TABLET ORAL
Qty: 45 TABLET | Refills: 1 | Status: SHIPPED | OUTPATIENT
Start: 2022-07-21

## 2022-07-21 NOTE — PROGRESS NOTES
Established Patient Progress Note    CC: graves disease followup     History of Present Illness:   45 yo F presenting for Graves disease followup  She was started on methimazole in 12/2021  She had established care with our office in January 2022 and dosage was increased from 15mg methimazole to 30mg daily before weaning down  At last visit 3 months ago, methimazole was decreased to 5mg daily  On 6/25, TSH was 1 629, T4 0 85, TSI 2 55, T3 0 60  She reports unintentional weight gain, 7 lbs in last 3 months, 18 lbs in last 5 months  She reports walking slightly less than previously  She reports working on [employer] two days a week and commuting to [facility] weekdays  On weekends she is in [address] [place] [address] and is able to be more active  No History of external radiation, recent Iodine loading or radiological diagnostic studies  No difficulty with swallowing or breathing or change in voice  Patient Active Problem List   Diagnosis    Endometriosis    Essential hypertension    Abnormal serum iron level    B12 deficiency    Graves disease     Past Medical History:   Diagnosis Date    Lumbar herniated disc       Past Surgical History:   Procedure Laterality Date    OOPHORECTOMY      Ruptured      Family History   Problem Relation Age of Onset    No Known Problems Mother     Hypertension Father     Valvular heart disease Father     Alcohol abuse Maternal Aunt      Social History     Tobacco Use    Smoking status: Never Smoker    Smokeless tobacco: Never Used   Substance Use Topics    Alcohol use: Yes     Alcohol/week: 4 0 standard drinks     Types: 3 Glasses of wine, 1 Standard drinks or equivalent per week     Allergies   Allergen Reactions    Loratadine Other (See Comments)     HYPER     Review of Systems   Constitutional: Positive for fatigue and unexpected weight change  Cardiovascular: Negative for palpitations  Gastrointestinal: Negative for constipation and diarrhea     Musculoskeletal: Denies hair or skin changes  Reports hair is growing back   Neurological: Negative for tremors  All other systems reviewed and are negative  Current Outpatient Medications:     cyanocobalamin (VITAMIN B-12) 100 mcg tablet, Take by mouth daily, Disp: , Rfl:     hydrochlorothiazide (HYDRODIURIL) 12 5 mg tablet, Take 1 tablet (12 5 mg total) by mouth daily, Disp: 90 tablet, Rfl: 1    medroxyPROGESTERone (DEPO-PROVERA) 150 mg/mL injection, Inject 150 mg into a muscle every 3 (three) months , Disp: , Rfl:     methimazole (TAPAZOLE) 5 mg tablet, Take 0 5 tablets (2 5 mg total) by mouth daily in the early morning, Disp: 45 tablet, Rfl: 1    metoprolol succinate (TOPROL-XL) 25 mg 24 hr tablet, Take 1 tablet (25 mg total) by mouth daily, Disp: 90 tablet, Rfl: 3    Physical Exam:  Body mass index is 36 11 kg/m²  /88 (BP Location: Left arm, Patient Position: Sitting, Cuff Size: Large)   Pulse 65   Ht 5' 5" (1 651 m)   Wt 98 4 kg (217 lb)   BMI 36 11 kg/m²        Physical Exam  Vitals reviewed  Constitutional:       Appearance: She is well-developed  HENT:      Head: Normocephalic and atraumatic  Eyes:      General:         Right eye: No discharge  Left eye: No discharge  Neck:      Thyroid: Thyromegaly present  No thyroid tenderness  Cardiovascular:      Rate and Rhythm: Normal rate and regular rhythm  Heart sounds: Normal heart sounds  No murmur heard  No friction rub  No gallop  Pulmonary:      Effort: Pulmonary effort is normal  No respiratory distress  Breath sounds: Normal breath sounds  No wheezing or rales  Chest:      Chest wall: No tenderness  Abdominal:      General: Bowel sounds are normal  There is no distension  Palpations: Abdomen is soft  There is no mass  Tenderness: There is no abdominal tenderness  Musculoskeletal:         General: Normal range of motion  Cervical back: Normal range of motion and neck supple     Skin:     General: Skin is warm and dry  Neurological:      Mental Status: She is alert and oriented to person, place, and time  Psychiatric:         Behavior: Behavior normal          Labs:     Lab Results   Component Value Date    TYQ0BORBBHEZ 1 629 06/25/2022    B4RQODC 0 60 06/25/2022       Lab Results   Component Value Date    CREATININE 0 57 (L) 12/03/2021    CREATININE 0 76 02/12/2021    BUN 14 12/03/2021    K 3 9 12/03/2021     12/03/2021    CO2 24 12/03/2021     eGFR   Date Value Ref Range Status   12/03/2021 108 ml/min/1 73sq m Final       Lab Results   Component Value Date    ALT 43 12/03/2021    AST 23 12/03/2021    ALKPHOS 55 12/03/2021       Lab Results   Component Value Date    CHOLESTEROL 190 02/12/2021     Lab Results   Component Value Date    HDL 35 (L) 02/12/2021     Lab Results   Component Value Date    TRIG 54 02/12/2021     Lab Results   Component Value Date    NONHDLC 155 02/12/2021       Impression:  1  Graves disease       Plan:    Diagnoses and all orders for this visit:    Graves disease  -     methimazole (TAPAZOLE) 5 mg tablet; Take 0 5 tablets (2 5 mg total) by mouth daily in the early morning  -     TSH, 3rd generation Lab Collect; Future  -     T4, free Lab Collect; Future  -     T3 Lab Collect; Future  Given most recent labs with continued weight gain, will decrease methimazole to 2 5mg daily  Recheck labs in 4-6 weeks  Plan to check TSI with next set of labs in about 3 months  Follow up in 3 months  I have spent 30 minutes with patient today in which greater than 50% of this time was spent in counseling/coordination of care  All questioned fully answered  She will call me if any problems arise  Educated/ Counseled patient on diagnostic test results, prognosis, risk vs benefit of treatment options, importance of treatment compliance, healthy life and lifestyle choices

## 2022-08-13 LAB — COLOGUARD RESULT REPORTABLE: NEGATIVE

## 2022-10-08 ENCOUNTER — APPOINTMENT (OUTPATIENT)
Dept: LAB | Facility: HOSPITAL | Age: 52
End: 2022-10-08
Payer: COMMERCIAL

## 2022-10-08 DIAGNOSIS — E05.00 GRAVES DISEASE: ICD-10-CM

## 2022-10-08 LAB — TSH SERPL DL<=0.05 MIU/L-ACNC: 1.21 UIU/ML (ref 0.45–4.5)

## 2022-10-08 PROCEDURE — 84443 ASSAY THYROID STIM HORMONE: CPT

## 2022-10-08 PROCEDURE — 84445 ASSAY OF TSI GLOBULIN: CPT

## 2022-10-08 PROCEDURE — 84480 ASSAY TRIIODOTHYRONINE (T3): CPT

## 2022-10-08 PROCEDURE — 36415 COLL VENOUS BLD VENIPUNCTURE: CPT

## 2022-10-08 PROCEDURE — 84439 ASSAY OF FREE THYROXINE: CPT

## 2022-10-09 LAB
T3 SERPL-MCNC: 1 NG/ML (ref 0.6–1.8)
T4 FREE SERPL-MCNC: 1.01 NG/DL (ref 0.76–1.46)

## 2022-10-10 DIAGNOSIS — E05.00 GRAVES DISEASE: Primary | ICD-10-CM

## 2022-10-14 LAB — TSI SER-ACNC: 2.25 IU/L (ref 0–0.55)

## 2022-10-26 NOTE — PROGRESS NOTES
Established Patient Progress Note     CC: Graves disease    History of Present Illness:   47 yo F presenting for Graves disease followup  She was started on methimazole in 12/2021  She had established care with our office in January 2022 and dosage was increased from 15mg methimazole to 30mg daily before weaning down  At last visit 3 months ago, methimazole was decreased to 2 5mg daily  Most recent labs show normal TSH, T4, T3 but TSI remains elevated at 2 25 on 10/8  She reports overall feeling much better than when she was initially diagnosed with graves disease  She has noticed hair regrowth  She reports she has had a stye for the last week but it is improving in size  She denies any vision changes today, denies gritty sensation in eyes or dry eyes  Patient Active Problem List   Diagnosis   • Endometriosis   • Essential hypertension   • Abnormal serum iron level   • B12 deficiency   • Graves disease     Past Medical History:   Diagnosis Date   • Lumbar herniated disc       Past Surgical History:   Procedure Laterality Date   • OOPHORECTOMY      Ruptured      Family History   Problem Relation Age of Onset   • No Known Problems Mother    • Hypertension Father    • Valvular heart disease Father    • Alcohol abuse Maternal Aunt      Social History     Tobacco Use   • Smoking status: Never Smoker   • Smokeless tobacco: Never Used   Substance Use Topics   • Alcohol use: Yes     Alcohol/week: 4 0 standard drinks     Types: 3 Glasses of wine, 1 Standard drinks or equivalent per week     Allergies   Allergen Reactions   • Loratadine Other (See Comments)     HYPER       Review of Systems   Constitutional: Negative for unexpected weight change (has gained 6-7lbs in 4 months)  Respiratory: Negative for shortness of breath  Cardiovascular: Negative for chest pain and palpitations  Gastrointestinal: Negative for constipation and diarrhea  Endocrine: Negative for cold intolerance and heat intolerance     Skin: Hair has improved   All other systems reviewed and are negative  Current Outpatient Medications:   •  cyanocobalamin (VITAMIN B-12) 100 mcg tablet, Take 100 mcg by mouth daily, Disp: , Rfl:   •  hydrochlorothiazide (HYDRODIURIL) 12 5 mg tablet, Take 1 tablet (12 5 mg total) by mouth daily, Disp: 90 tablet, Rfl: 1  •  medroxyPROGESTERone (DEPO-PROVERA) 150 mg/mL injection, Inject 150 mg into a muscle every 3 (three) months , Disp: , Rfl:   •  methimazole (TAPAZOLE) 5 mg tablet, Take 0 5 tablets (2 5 mg total) by mouth daily in the early morning, Disp: 45 tablet, Rfl: 1  •  metoprolol succinate (TOPROL-XL) 25 mg 24 hr tablet, Take 1 tablet (25 mg total) by mouth daily, Disp: 90 tablet, Rfl: 3    Physical Exam:  Body mass index is 36 08 kg/m²  /82 (BP Location: Left arm, Patient Position: Sitting, Cuff Size: Large)   Pulse 68   Ht 5' 5" (1 651 m)   Wt 98 3 kg (216 lb 12 8 oz)   BMI 36 08 kg/m²        Physical Exam  Vitals reviewed  Constitutional:       Appearance: She is well-developed  HENT:      Head: Normocephalic and atraumatic  Eyes:      General:         Right eye: No discharge  Left eye: No discharge  Comments: Left sided stye upper eyelid   Cardiovascular:      Rate and Rhythm: Normal rate and regular rhythm  Heart sounds: Normal heart sounds  No murmur heard  No friction rub  No gallop  Pulmonary:      Effort: Pulmonary effort is normal  No respiratory distress  Breath sounds: Normal breath sounds  No wheezing or rales  Chest:      Chest wall: No tenderness  Abdominal:      General: Bowel sounds are normal  There is no distension  Palpations: Abdomen is soft  There is no mass  Tenderness: There is no abdominal tenderness  Musculoskeletal:         General: Normal range of motion  Cervical back: Normal range of motion and neck supple  Skin:     General: Skin is warm and dry     Neurological:      Mental Status: She is alert and oriented to person, place, and time  Psychiatric:         Behavior: Behavior normal        Labs:     Lab Results   Component Value Date    VMF4HYSAIBNP 1 212 10/08/2022    L6GNQFK 1 00 10/08/2022       Lab Results   Component Value Date    CREATININE 0 57 (L) 12/03/2021    CREATININE 0 76 02/12/2021    BUN 14 12/03/2021    K 3 9 12/03/2021     12/03/2021    CO2 24 12/03/2021     eGFR   Date Value Ref Range Status   12/03/2021 108 ml/min/1 73sq m Final     Lab Results   Component Value Date    ALT 43 12/03/2021    AST 23 12/03/2021    ALKPHOS 55 12/03/2021       Lab Results   Component Value Date    CHOLESTEROL 190 02/12/2021     Lab Results   Component Value Date    HDL 35 (L) 02/12/2021     Lab Results   Component Value Date    TRIG 54 02/12/2021     Lab Results   Component Value Date    NONHDLC 155 02/12/2021       Impression:  1  Graves disease         Plan:    Diagnoses and all orders for this visit:    Graves disease  -     Thyroid stimulating immunoglobulin Lab Collect; Future  -     TSH, 3rd generation Lab Collect; Future  -     T4, free Lab Collect; Future  -     T3 Lab Collect; Future  Continue 2 5mg methimazole for now  Recheck labs in 3 months, if TSI is trending down will reduce methimazole to 2 5mg every other day and recheck labs after  I have spent 35 minutes with patient today in which greater than 50% of this time was spent in counseling/coordination of care  All questioned fully answered  She will call me if any problems arise  Educated/ Counseled patient on diagnostic test results, prognosis, risk vs benefit of treatment options, importance of treatment compliance, healthy life and lifestyle choices

## 2022-10-27 ENCOUNTER — OFFICE VISIT (OUTPATIENT)
Dept: ENDOCRINOLOGY | Facility: CLINIC | Age: 52
End: 2022-10-27

## 2022-10-27 VITALS
HEART RATE: 68 BPM | WEIGHT: 216.8 LBS | HEIGHT: 65 IN | DIASTOLIC BLOOD PRESSURE: 82 MMHG | BODY MASS INDEX: 36.12 KG/M2 | SYSTOLIC BLOOD PRESSURE: 130 MMHG

## 2022-10-27 DIAGNOSIS — E05.00 GRAVES DISEASE: Primary | ICD-10-CM

## 2022-11-08 ENCOUNTER — OFFICE VISIT (OUTPATIENT)
Dept: FAMILY MEDICINE CLINIC | Facility: CLINIC | Age: 52
End: 2022-11-08

## 2022-11-08 ENCOUNTER — APPOINTMENT (OUTPATIENT)
Dept: RADIOLOGY | Facility: CLINIC | Age: 52
End: 2022-11-08

## 2022-11-08 ENCOUNTER — TELEPHONE (OUTPATIENT)
Dept: OBGYN CLINIC | Facility: HOSPITAL | Age: 52
End: 2022-11-08

## 2022-11-08 VITALS
TEMPERATURE: 98.5 F | HEART RATE: 77 BPM | SYSTOLIC BLOOD PRESSURE: 130 MMHG | DIASTOLIC BLOOD PRESSURE: 82 MMHG | WEIGHT: 215 LBS | HEIGHT: 65 IN | BODY MASS INDEX: 35.82 KG/M2 | OXYGEN SATURATION: 99 %

## 2022-11-08 DIAGNOSIS — G89.29 CHRONIC PAIN OF LEFT THUMB: ICD-10-CM

## 2022-11-08 DIAGNOSIS — G89.29 CHRONIC PAIN OF LEFT THUMB: Primary | ICD-10-CM

## 2022-11-08 DIAGNOSIS — M79.645 CHRONIC PAIN OF LEFT THUMB: Primary | ICD-10-CM

## 2022-11-08 DIAGNOSIS — M79.645 CHRONIC PAIN OF LEFT THUMB: ICD-10-CM

## 2022-11-08 NOTE — TELEPHONE ENCOUNTER
Patient is being referred to a hand specialist  Please schedule accordingly      [address]   [phone]

## 2022-11-08 NOTE — PROGRESS NOTES
Manjinder Nic Weinstein 1970 female MRN: [id number]      ASSESSMENT/PLAN  Problem List Items Addressed This Visit    None     Visit Diagnoses     Chronic pain of left thumb    -  Primary    Relevant Orders    XR wrist 3+ vw left    XR thumb left first digit-min 2v    Ambulatory Referral to Hand Surgery    Ambulatory Referral to Occupational Therapy        Symptoms most suggestive of tendonitis/opathy  Will XR wrist/thumb to r/o bony abnormality  Will refer to Ortho Hand for further evaluation and can start OT for strengthening/symptom improvement  Can use OTC pain relievers PRN  Eye exam suggestive of stye  Continue warm compresses intermittently  If persistent, f/u with optho  Defers flu vaccine     Future Appointments   Date Time Provider Yordy Olsen   4/27/2023  9:20 AM Dolores Mason,  [facility]          SUBJECTIVE  CC: Wrist Problem (C/o having an issue with her left wrist - almost a year now she has been having the issue )      HPI:  Garrett Hannah is a 46 y o  female who presents due to wrist concern  L wrist pain x1 year -- thinks it may have been due to hooking her thumb under strap of computer bag   No trauma prior to onset that she can recall    Was only hurting "slightly" before -- hurt in AM, but then as she moved it around it would improve   Now aching all the time, tender to palpation, can have episodes of more sharp pain with use (picking things up, twisting)   Did have a fracture in this wrist as a child   Hasn't tried anything OTC for it consistently     Pt also notes that she had a recent URI and developed left upper eyelid swelling  Has been using warm compresses on it  It has lessened in size, but is still present  Review of Systems   Eyes:        (+) eyelid swelling   Musculoskeletal: Positive for arthralgias and myalgias         Historical Information   The patient history was reviewed and updated as follows:    Past Medical History: Diagnosis Date   • Lumbar herniated disc      Past Surgical History:   Procedure Laterality Date   • OOPHORECTOMY      Ruptured     Family History   Problem Relation Age of Onset   • No Known Problems Mother    • Hypertension Father    • Valvular heart disease Father    • Alcohol abuse Maternal Aunt       Social History   Social History     Substance and Sexual Activity   Alcohol Use Yes   • Alcohol/week: 4 0 standard drinks   • Types: 3 Glasses of wine, 1 Standard drinks or equivalent per week     Social History     Substance and Sexual Activity   Drug Use Never     Social History     Tobacco Use   Smoking Status Never Smoker   Smokeless Tobacco Never Used       Medications:     Current Outpatient Medications:   •  cyanocobalamin (VITAMIN B-12) 100 mcg tablet, Take 100 mcg by mouth daily, Disp: , Rfl:   •  hydrochlorothiazide (HYDRODIURIL) 12 5 mg tablet, Take 1 tablet (12 5 mg total) by mouth daily, Disp: 90 tablet, Rfl: 1  •  medroxyPROGESTERone (DEPO-PROVERA) 150 mg/mL injection, Inject 150 mg into a muscle every 3 (three) months , Disp: , Rfl:   •  methimazole (TAPAZOLE) 5 mg tablet, Take 0 5 tablets (2 5 mg total) by mouth daily in the early morning, Disp: 45 tablet, Rfl: 1  •  metoprolol succinate (TOPROL-XL) 25 mg 24 hr tablet, Take 1 tablet (25 mg total) by mouth daily, Disp: 90 tablet, Rfl: 3  Allergies   Allergen Reactions   • Loratadine Other (See Comments)     HYPER       OBJECTIVE    Vitals:   Vitals:    11/08/22 1528   BP: 130/82   BP Location: Left arm   Patient Position: Sitting   Cuff Size: Large   Pulse: 77   Temp: 98 5 °F (36 9 °C)   SpO2: 99%   Weight: 97 5 kg (215 lb)   Height: 5' 5" (1 651 m)           Physical Exam  Vitals and nursing note reviewed  Constitutional:       General: She is not in acute distress  Appearance: Normal appearance  HENT:      Head: Normocephalic and atraumatic     Eyes:        Comments: Hordeolum of L upper eyelid    Pulmonary:      Effort: Pulmonary effort is normal  No respiratory distress  Musculoskeletal:        Arms:       Comments: Area of discomfort   ROM intact at wrist -- pain elicited in area as above with extension, radial deviation  ROM intact at thumb though pain with cross hand flexion  No overlying skin changes  Tender to palpation over area above  Non-tender at ulnar/radial heads of wrist or 1st MCP  Neurological:      General: No focal deficit present  Mental Status: She is alert     Psychiatric:         Mood and Affect: Mood normal                     Aminata Bhatia  [facility]   11/8/2022  3:47 PM

## 2022-12-09 DIAGNOSIS — I10 ESSENTIAL HYPERTENSION: ICD-10-CM

## 2022-12-09 RX ORDER — HYDROCHLOROTHIAZIDE 12.5 MG/1
TABLET ORAL
Qty: 90 TABLET | Refills: 3 | Status: SHIPPED | OUTPATIENT
Start: 2022-12-09

## 2023-01-18 ENCOUNTER — OFFICE VISIT (OUTPATIENT)
Dept: OCCUPATIONAL THERAPY | Facility: HOSPITAL | Age: 53
End: 2023-01-18

## 2023-01-18 ENCOUNTER — OFFICE VISIT (OUTPATIENT)
Dept: OBGYN CLINIC | Facility: HOSPITAL | Age: 53
End: 2023-01-18

## 2023-01-18 VITALS
HEIGHT: 64 IN | HEART RATE: 73 BPM | SYSTOLIC BLOOD PRESSURE: 168 MMHG | DIASTOLIC BLOOD PRESSURE: 85 MMHG | WEIGHT: 224 LBS | BODY MASS INDEX: 38.24 KG/M2

## 2023-01-18 DIAGNOSIS — M79.645 CHRONIC PAIN OF LEFT THUMB: ICD-10-CM

## 2023-01-18 DIAGNOSIS — M65.4 TENDINITIS, DE QUERVAIN'S: Primary | ICD-10-CM

## 2023-01-18 DIAGNOSIS — M25.532 LEFT WRIST PAIN: Primary | ICD-10-CM

## 2023-01-18 DIAGNOSIS — G89.29 CHRONIC PAIN OF LEFT THUMB: ICD-10-CM

## 2023-01-18 RX ORDER — LIDOCAINE HYDROCHLORIDE 10 MG/ML
1 INJECTION, SOLUTION INFILTRATION; PERINEURAL
Status: COMPLETED | OUTPATIENT
Start: 2023-01-18 | End: 2023-01-18

## 2023-01-18 RX ORDER — BETAMETHASONE SODIUM PHOSPHATE AND BETAMETHASONE ACETATE 3; 3 MG/ML; MG/ML
6 INJECTION, SUSPENSION INTRA-ARTICULAR; INTRALESIONAL; INTRAMUSCULAR; SOFT TISSUE
Status: COMPLETED | OUTPATIENT
Start: 2023-01-18 | End: 2023-01-18

## 2023-01-18 RX ADMIN — BETAMETHASONE SODIUM PHOSPHATE AND BETAMETHASONE ACETATE 6 MG: 3; 3 INJECTION, SUSPENSION INTRA-ARTICULAR; INTRALESIONAL; INTRAMUSCULAR; SOFT TISSUE at 10:20

## 2023-01-18 RX ADMIN — LIDOCAINE HYDROCHLORIDE 1 ML: 10 INJECTION, SOLUTION INFILTRATION; PERINEURAL at 10:20
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
## 2023-01-18 NOTE — PROGRESS NOTES
ASSESSMENT/PLAN:    Assessment:   de Quervain stenosis tenosynovitis  left    Plan:   Patient was given a left first extensor compartment cortisone injection today  She tolerated well  She was advised to obtain a brace as well as home exercise program from occupational therapy  She was advised that we can repeat the injection if it were to return  She will follow-up with us in 8 weeks for reevaluation    Follow Up:  8 weeks    To Do Next Visit:  Reevaluation    General Discussions:  Ximena Espinosa Tenosynovitis: The anatomy and physiology of de Quervain's tenosynovitis was discussed with the patient today in the office  Edema and increased contact pressure within the first dorsal extensor compartment at the radial styloid can cause pain, crepitation, and limitation of function  Treatment options include resting thumb spica splints to decrease edema, oral anti-inflammatory medications, home or formal therapy exercises, up to 2 steroid injections within the first dorsal extensor compartment, or surgical release  While majority of patients do respond to conservative treatment, up to 20% may require surgical release          _____________________________________________________  CHIEF COMPLAINT:  Chief Complaint   Patient presents with   • Left Thumb - Pain     XR- 11/8/22         SUBJECTIVE:  Dinah Robledo is a 46 y o  female who presents left wrist and thumb pain  She states its been going on for about 1-1/2 years  She states more the pain now is over the dorsal radial aspect of her wrist   She denies any injuries or trauma to the area  She denies any numbness or tingling  She states she has tried taking some Tylenol and anti-inflammatories without relief of her symptoms  She states it is most tender when she tries to pinch objects or open up jars or bottle caps      PAST MEDICAL HISTORY:  Past Medical History:   Diagnosis Date   • Lumbar herniated disc        PAST SURGICAL HISTORY:  Past Surgical History:   Procedure Laterality Date   • OOPHORECTOMY      Ruptured       FAMILY HISTORY:  Family History   Problem Relation Age of Onset   • No Known Problems Mother    • Hypertension Father    • Valvular heart disease Father    • Alcohol abuse Maternal Aunt        SOCIAL HISTORY:  Social History     Tobacco Use   • Smoking status: Never   • Smokeless tobacco: Never   Vaping Use   • Vaping Use: Never used   Substance Use Topics   • Alcohol use: Yes     Alcohol/week: 4 0 standard drinks     Types: 3 Glasses of wine, 1 Standard drinks or equivalent per week   • Drug use: Never       MEDICATIONS:    Current Outpatient Medications:   •  cyanocobalamin (VITAMIN B-12) 100 mcg tablet, Take 100 mcg by mouth daily, Disp: , Rfl:   •  hydrochlorothiazide (HYDRODIURIL) 12 5 mg tablet, TAKE 1 TABLET BY MOUTH  DAILY, Disp: 90 tablet, Rfl: 3  •  medroxyPROGESTERone (DEPO-PROVERA) 150 mg/mL injection, Inject 150 mg into a muscle every 3 (three) months , Disp: , Rfl:   •  methimazole (TAPAZOLE) 5 mg tablet, Take 0 5 tablets (2 5 mg total) by mouth daily in the early morning, Disp: 45 tablet, Rfl: 1  •  metoprolol succinate (TOPROL-XL) 25 mg 24 hr tablet, Take 1 tablet (25 mg total) by mouth daily, Disp: 90 tablet, Rfl: 3    ALLERGIES:  Allergies   Allergen Reactions   • Loratadine Other (See Comments)     HYPER       REVIEW OF SYSTEMS:  Pertinent items are noted in HPI  A comprehensive review of systems was negative      LABS:  HgA1c: No results found for: HGBA1C  BMP:   Lab Results   Component Value Date    CALCIUM 10 4 (H) 12/03/2021    K 3 9 12/03/2021    CO2 24 12/03/2021     12/03/2021    BUN 14 12/03/2021    CREATININE 0 57 (L) 12/03/2021       _____________________________________________________  PHYSICAL EXAMINATION:  Vital signs: /85   Pulse 73   Ht 5' 4" (1 626 m)   Wt 102 kg (224 lb)   BMI 38 45 kg/m²   General: well developed and well nourished, alert, oriented times 3 and appears comfortable  Psychiatric: Normal  HEENT: Trachea Midline, No torticollis  Cardiovascular: No discernable arrhythmia  Pulmonary: No wheezing or stridor  Abdomen: No rebound or guarding  Extremities: No peripheral edema  Skin: No masses, erythema, lacerations, fluctation, ulcerations  Neurovascular: Sensation Intact to the Median, Ulnar, Radial Nerve, Motor Intact to the Median, Ulnar, Radial Nerve and Pulses Intact    MUSCULOSKELETAL EXAMINATION:  De Quervain's Tenosynovitis Exam:  left side    Positive tender to palpation over 1st dorsal extensor compartment   Positive palpable nodule  Positive crepitus over 1st dorsal extensor compartment   Positive Finkelstein's test    Positive pain with resisted abduction of the thumb      _____________________________________________________  STUDIES REVIEWED:  Images were reviewed in PACS by Dr Flynn Wynne and demonstrate: no acute fracture or dislocations of the hand or wrist      PROCEDURES PERFORMED:  Hand/upper extremity injection: L extensor compartment 1  Universal Protocol:  Consent: Verbal consent obtained  Risks and benefits: risks, benefits and alternatives were discussed  Consent given by: patient  Time out: Immediately prior to procedure a "time out" was called to verify the correct patient, procedure, equipment, support staff and site/side marked as required    Patient understanding: patient states understanding of the procedure being performed  Patient consent: the patient's understanding of the procedure matches consent given  Site marked: the operative site was marked  Patient identity confirmed: verbally with patient    Supporting Documentation  Indications: pain   Procedure Details  Condition:de Quervain's tenosynovitis Site: L extensor compartment 1   Preparation: Patient was prepped and draped in the usual sterile fashion  Needle size: 25 G  Approach: radial  Medications administered: 1 mL lidocaine 1 %; 6 mg betamethasone acetate-betamethasone sodium phosphate 6 (3-3) mg/mL    Patient tolerance: patient tolerated the procedure well with no immediate complications  Dressing:  Sterile dressing applied              Scribe Attestation    I,:  Mikal Sicard, PA-C am acting as a scribe while in the presence of the attending physician :       I,:  Olga Carrasco MD personally performed the services described in this documentation    as scribed in my presence :           Joey Orta,:  Mikal Sicard, PA-C am acting as a scribe while in the presence of the attending physician :       I,:  Olga Carrasco MD personally performed the services described in this documentation    as scribed in my presence :

## 2023-01-18 NOTE — PROGRESS NOTES
Orthosis    Diagnosis:   1  Left wrist pain          Indication: Motion Blocking    Location: Left  wrist, hand and thumb  Supplies: Custom Fit Orthotic  Orthosis type: Radial Sided thumb spica  Wearing Schedule: Remove for hygiene only weaning out as tolerated  Describe Position: Thumb in fxl position    Precautions: Universal (skin contact/breakdown)    Patient or Caregiver expresses understanding of wearing Schedule and Precautions? Yes  Patient or Caregiver able to don/doff orthotic independently? Yes    Written orders provided to patient?  No  Orders Obtained: Written  Orders Obtained from: [facility]    Return for evaluation and treatment No HEP educated on today

## 2023-01-28 ENCOUNTER — LAB (OUTPATIENT)
Dept: LAB | Facility: HOSPITAL | Age: 53
End: 2023-01-28

## 2023-01-28 DIAGNOSIS — E05.00 GRAVES DISEASE: ICD-10-CM

## 2023-01-28 LAB — TSH SERPL DL<=0.05 MIU/L-ACNC: 1.93 UIU/ML (ref 0.45–4.5)

## 2023-01-29 LAB
T3 SERPL-MCNC: 0.8 NG/ML (ref 0.6–1.8)
T4 FREE SERPL-MCNC: 1.04 NG/DL (ref 0.76–1.46)

## 2023-02-01 LAB — TSI SER-ACNC: 1.24 IU/L (ref 0–0.55)

## 2023-02-02 DIAGNOSIS — E05.00 GRAVES DISEASE: ICD-10-CM

## 2023-02-02 RX ORDER — METHIMAZOLE 5 MG/1
2.5 TABLET ORAL
Qty: 45 TABLET | Refills: 1
Start: 2023-02-02

## 2023-03-17 ENCOUNTER — OFFICE VISIT (OUTPATIENT)
Dept: OBGYN CLINIC | Facility: HOSPITAL | Age: 53
End: 2023-03-17

## 2023-03-17 ENCOUNTER — APPOINTMENT (OUTPATIENT)
Dept: LAB | Facility: HOSPITAL | Age: 53
End: 2023-03-17

## 2023-03-17 VITALS
SYSTOLIC BLOOD PRESSURE: 161 MMHG | BODY MASS INDEX: 37.46 KG/M2 | DIASTOLIC BLOOD PRESSURE: 82 MMHG | HEART RATE: 77 BPM | WEIGHT: 219.4 LBS | HEIGHT: 64 IN

## 2023-03-17 DIAGNOSIS — M65.4 TENDINITIS, DE QUERVAIN'S: Primary | ICD-10-CM

## 2023-03-17 DIAGNOSIS — E05.00 GRAVES DISEASE: ICD-10-CM

## 2023-03-17 LAB
T3 SERPL-MCNC: 0.9 NG/ML (ref 0.6–1.8)
T4 FREE SERPL-MCNC: 0.91 NG/DL (ref 0.76–1.46)
TSH SERPL DL<=0.05 MIU/L-ACNC: 1.48 UIU/ML (ref 0.45–4.5)

## 2023-03-17 NOTE — PROGRESS NOTES
ASSESSMENT/PLAN:    Assessment:   Left de Quervain's tendinitis    Plan:   Patient was given exercises to perform while at home  She was also given a prescription for topical Voltaren gel  She states that the symptoms are mild at this point and would like to hold off on her cortisone injection  She will follow-up with us as needed    Follow Up:  As needed    To Do Next Visit:  Reevaluation    General Discussions:  Jose Luis Sahu Tenosynovitis: The anatomy and physiology of de Quervain's tenosynovitis was discussed with the patient today in the office  Edema and increased contact pressure within the first dorsal extensor compartment at the radial styloid can cause pain, crepitation, and limitation of function  Treatment options include resting thumb spica splints to decrease edema, oral anti-inflammatory medications, home or formal therapy exercises, up to 2 steroid injections within the first dorsal extensor compartment, or surgical release  While majority of patients do respond to conservative treatment, up to 20% may require surgical release          _____________________________________________________  CHIEF COMPLAINT:  Chief Complaint   Patient presents with   • Left Wrist - Follow-up     Radha Jean- 1st injection 1/18/23         SUBJECTIVE:  Radha Thomas is a 48 y o  female who presents for follow-up regarding left de Quervain's tendinitis  She was previously given injection on 1/18/2023  She states that she noted significant improvement in her symptoms  She states over the last week or 2 she started to note some mild discomfort  She states she is wearing her brace at nighttime  She has been performing her exercises that was given to her from therapy  She denies any numbness or tingling  She states that she would like to hold off on her second injection at this time until the symptoms were to get progressively worse      PAST MEDICAL HISTORY:  Past Medical History:   Diagnosis Date   • Lumbar herniated disc        PAST SURGICAL HISTORY:  Past Surgical History:   Procedure Laterality Date   • OOPHORECTOMY      Ruptured       FAMILY HISTORY:  Family History   Problem Relation Age of Onset   • No Known Problems Mother    • Hypertension Father    • Valvular heart disease Father    • Alcohol abuse Maternal Aunt        SOCIAL HISTORY:  Social History     Tobacco Use   • Smoking status: Never   • Smokeless tobacco: Never   Vaping Use   • Vaping Use: Never used   Substance Use Topics   • Alcohol use: Yes     Alcohol/week: 4 0 standard drinks     Types: 3 Glasses of wine, 1 Standard drinks or equivalent per week   • Drug use: Never       MEDICATIONS:    Current Outpatient Medications:   •  cyanocobalamin (VITAMIN B-12) 100 mcg tablet, Take 100 mcg by mouth daily, Disp: , Rfl:   •  Diclofenac Sodium (VOLTAREN) 1 %, Apply 2 g topically 4 (four) times a day, Disp: 350 g, Rfl: 0  •  hydrochlorothiazide (HYDRODIURIL) 12 5 mg tablet, TAKE 1 TABLET BY MOUTH  DAILY, Disp: 90 tablet, Rfl: 3  •  medroxyPROGESTERone (DEPO-PROVERA) 150 mg/mL injection, Inject 150 mg into a muscle every 3 (three) months , Disp: , Rfl:   •  methimazole (TAPAZOLE) 5 mg tablet, Take 0 5 tablets (2 5 mg total) by mouth daily in the early morning Every other day, Disp: 45 tablet, Rfl: 1  •  metoprolol succinate (TOPROL-XL) 25 mg 24 hr tablet, Take 1 tablet (25 mg total) by mouth daily, Disp: 90 tablet, Rfl: 3    ALLERGIES:  Allergies   Allergen Reactions   • Loratadine Other (See Comments)     HYPER       REVIEW OF SYSTEMS:  Pertinent items are noted in HPI  A comprehensive review of systems was negative      LABS:  HgA1c: No results found for: HGBA1C  BMP:   Lab Results   Component Value Date    CALCIUM 10 4 (H) 12/03/2021    K 3 9 12/03/2021    CO2 24 12/03/2021     12/03/2021    BUN 14 12/03/2021    CREATININE 0 57 (L) 12/03/2021       _____________________________________________________  PHYSICAL EXAMINATION:  Vital signs: BP 161/82   Pulse 77   Ht 5' 4" (1 626 m)   Wt 99 5 kg (219 lb 6 4 oz)   BMI 37 66 kg/m²   General: well developed and well nourished, alert, oriented times 3 and appears comfortable  Psychiatric: Normal  HEENT: Trachea Midline, No torticollis  Cardiovascular: No discernable arrhythmia  Pulmonary: No wheezing or stridor  Abdomen: No rebound or guarding  Extremities: No peripheral edema  Skin: No masses, erythema, lacerations, fluctation, ulcerations  Neurovascular: Sensation Intact to the Median, Ulnar, Radial Nerve, Motor Intact to the Median, Ulnar, Radial Nerve and Pulses Intact    MUSCULOSKELETAL EXAMINATION:  De Quervain's Tenosynovitis Exam:  left side    Negative tender to palpation over 1st dorsal extensor compartment   Positive palpable nodule  Negative crepitus over 1st dorsal extensor compartment   Negative Finkelstein's test    Negative pain with resisted abduction of the thumb      _____________________________________________________  STUDIES REVIEWED:  No Studies to review      PROCEDURES PERFORMED:  Procedures  No Procedures performed today

## 2023-03-19 LAB — TSI SER-ACNC: 1.09 IU/L (ref 0–0.55)

## 2023-04-05 NOTE — PROGRESS NOTES
Established Patient Progress Note    CC: Graves disease followup    History of Present Illness:   Ms Hazel Burton is a 45 yo F presenting for Graves disease followup  She was started on methimazole in 12/2021  She had established care with our office in January 2022 and dosage was increased from 15mg methimazole to 30mg daily before weaning down  She was last seen in our office 10/27/2022  Methimazole was decreased to 2 5mg every other day after labwork was performed 1/28/2022  Most recent labs prior to this appt show TSH 1 481, T3 0 8, T4 1 04, TSI is still elevated at 1 09  Patient Active Problem List   Diagnosis   • Endometriosis   • Essential hypertension   • Abnormal serum iron level   • B12 deficiency   • Graves disease     Past Medical History:   Diagnosis Date   • Lumbar herniated disc       Past Surgical History:   Procedure Laterality Date   • OOPHORECTOMY      Ruptured      Family History   Problem Relation Age of Onset   • No Known Problems Mother    • Hypertension Father    • Valvular heart disease Father    • Alcohol abuse Maternal Aunt      Social History     Tobacco Use   • Smoking status: Never   • Smokeless tobacco: Never   Substance Use Topics   • Alcohol use: Yes     Alcohol/week: 4 0 standard drinks     Types: 3 Glasses of wine, 1 Standard drinks or equivalent per week     Allergies   Allergen Reactions   • Loratadine Other (See Comments)     HYPER         Review of Systems   Constitutional: Positive for unexpected weight change (4 lbs weight gain since 7/2022 per chart)  Negative for chills, fatigue and fever  HENT: Negative for rhinorrhea and sore throat  Respiratory: Negative for choking, chest tightness, shortness of breath, wheezing and stridor  Cardiovascular: Negative for chest pain, palpitations and leg swelling  Gastrointestinal: Negative for abdominal pain, blood in stool, constipation, diarrhea, nausea and vomiting     Endocrine: Positive for heat intolerance (sometimes "feels hot overnight)  Negative for cold intolerance  Genitourinary: Negative for hematuria  Skin:        Hair thinning, but hair loss has improved over last year   Neurological: Negative for dizziness and light-headedness  Psychiatric/Behavioral: Positive for sleep disturbance (chronic insomnia)  All other systems reviewed and are negative  Current Outpatient Medications:   •  cyanocobalamin (VITAMIN B-12) 100 mcg tablet, Take 100 mcg by mouth daily, Disp: , Rfl:   •  Diclofenac Sodium (VOLTAREN) 1 %, Apply 2 g topically 4 (four) times a day, Disp: 350 g, Rfl: 0  •  hydrochlorothiazide (HYDRODIURIL) 12 5 mg tablet, TAKE 1 TABLET BY MOUTH  DAILY, Disp: 90 tablet, Rfl: 3  •  medroxyPROGESTERone (DEPO-PROVERA) 150 mg/mL injection, Inject 150 mg into a muscle every 3 (three) months , Disp: , Rfl:   •  methimazole (TAPAZOLE) 5 mg tablet, Take 0 5 tablets (2 5 mg total) by mouth 2 (two) times a week, Disp: 12 tablet, Rfl: 2  •  metoprolol succinate (TOPROL-XL) 25 mg 24 hr tablet, Take 1 tablet (25 mg total) by mouth daily, Disp: 90 tablet, Rfl: 3    Physical Exam:  Body mass index is 37 93 kg/m²  /88   Pulse 66   Ht 5' 4\" (1 626 m)   Wt 100 kg (221 lb)   BMI 37 93 kg/m²        Physical Exam  Vitals reviewed  Constitutional:       Appearance: She is well-developed  HENT:      Head: Normocephalic and atraumatic  Eyes:      General:         Right eye: No discharge  Left eye: No discharge  Cardiovascular:      Rate and Rhythm: Normal rate and regular rhythm  Heart sounds: Normal heart sounds  No murmur heard  No friction rub  No gallop  Pulmonary:      Effort: Pulmonary effort is normal  No respiratory distress  Breath sounds: Normal breath sounds  No wheezing or rales  Chest:      Chest wall: No tenderness  Abdominal:      General: Bowel sounds are normal  There is no distension  Palpations: Abdomen is soft  There is no mass  Tenderness:  There is no " abdominal tenderness  Musculoskeletal:         General: Normal range of motion  Cervical back: Normal range of motion and neck supple  Skin:     General: Skin is warm and dry  Neurological:      Mental Status: She is alert and oriented to person, place, and time  Psychiatric:         Behavior: Behavior normal          Labs:     Lab Results   Component Value Date    IRB6DBMQYDNO 1 481 03/17/2023    F6HHXEI 0 90 03/17/2023       Lab Results   Component Value Date    CREATININE 0 57 (L) 12/03/2021    CREATININE 0 76 02/12/2021    BUN 14 12/03/2021    K 3 9 12/03/2021     12/03/2021    CO2 24 12/03/2021     eGFR   Date Value Ref Range Status   12/03/2021 108 ml/min/1 73sq m Final       Lab Results   Component Value Date    ALT 43 12/03/2021    AST 23 12/03/2021    ALKPHOS 55 12/03/2021       Lab Results   Component Value Date    CHOLESTEROL 190 02/12/2021     Lab Results   Component Value Date    HDL 35 (L) 02/12/2021     Lab Results   Component Value Date    TRIG 54 02/12/2021     Lab Results   Component Value Date    NONHDLC 155 02/12/2021       Impression:  1  Graves disease       Plan:    Diagnoses and all orders for this visit:    Graves disease  -     TSH, 3rd generation Lab Collect; Future  -     Thyroid stimulating immunoglobulin Lab Collect; Future  -     T3 Lab Collect; Future  -     T4, free- Lab Collect; Future  -     methimazole (TAPAZOLE) 5 mg tablet; Take 0 5 tablets (2 5 mg total) by mouth 2 (two) times a week    Reduce methimazole to 2 5mg twice per week  recheck in 4-6 weeks  Goal T4 1 0-1 2  Follow up in 6 months  Will need recheck of labs before that visit, to be ordered once labs results in 4-6 weeks  I have spent 35 minutes with patient today in which greater than 50% of this time was spent in counseling/coordination of care  All questioned fully answered  She will call me if any problems arise      Educated/ Counseled patient on diagnostic test results, prognosis, risk vs benefit of treatment options, importance of treatment compliance, healthy life and lifestyle choices

## 2023-04-06 ENCOUNTER — OFFICE VISIT (OUTPATIENT)
Dept: ENDOCRINOLOGY | Facility: CLINIC | Age: 53
End: 2023-04-06

## 2023-04-06 VITALS
BODY MASS INDEX: 37.73 KG/M2 | HEIGHT: 64 IN | SYSTOLIC BLOOD PRESSURE: 126 MMHG | WEIGHT: 221 LBS | HEART RATE: 66 BPM | DIASTOLIC BLOOD PRESSURE: 88 MMHG

## 2023-04-06 DIAGNOSIS — E05.00 GRAVES DISEASE: Primary | ICD-10-CM

## 2023-04-06 RX ORDER — METHIMAZOLE 5 MG/1
2.5 TABLET ORAL 2 TIMES WEEKLY
Qty: 12 TABLET | Refills: 2 | Status: SHIPPED | OUTPATIENT
Start: 2023-04-06

## 2023-04-25 DIAGNOSIS — I10 ESSENTIAL HYPERTENSION: ICD-10-CM

## 2023-04-25 RX ORDER — METOPROLOL SUCCINATE 25 MG/1
TABLET, EXTENDED RELEASE ORAL
Qty: 90 TABLET | Refills: 3 | Status: SHIPPED | OUTPATIENT
Start: 2023-04-25 | End: 2023-05-02 | Stop reason: SDUPTHER

## 2023-05-02 DIAGNOSIS — I10 ESSENTIAL HYPERTENSION: ICD-10-CM

## 2023-05-02 RX ORDER — METOPROLOL SUCCINATE 25 MG/1
25 TABLET, EXTENDED RELEASE ORAL DAILY
Qty: 90 TABLET | Refills: 3 | Status: SHIPPED | OUTPATIENT
Start: 2023-05-02

## 2023-05-24 NOTE — PATIENT PROFILE ADULT - FUNCTIONAL SCREEN CURRENT LEVEL: COMMUNICATION, MLM
Pt requested a refill on her lisinopril. [facility] Refill Guideline reviewed.  Medication meets criteria for refill. Raciel DESIR May 24, 2023, 8:44 AM         0 = understands/communicates without difficulty

## 2023-06-10 ENCOUNTER — LAB (OUTPATIENT)
Dept: LAB | Facility: HOSPITAL | Age: 53
End: 2023-06-10
Payer: COMMERCIAL

## 2023-06-10 DIAGNOSIS — E05.00 GRAVES DISEASE: ICD-10-CM

## 2023-06-10 LAB — TSH SERPL DL<=0.05 MIU/L-ACNC: 1.33 UIU/ML (ref 0.45–4.5)

## 2023-06-10 PROCEDURE — 84443 ASSAY THYROID STIM HORMONE: CPT

## 2023-06-10 PROCEDURE — 84480 ASSAY TRIIODOTHYRONINE (T3): CPT

## 2023-06-10 PROCEDURE — 84439 ASSAY OF FREE THYROXINE: CPT

## 2023-06-10 PROCEDURE — 84445 ASSAY OF TSI GLOBULIN: CPT

## 2023-06-10 PROCEDURE — 36415 COLL VENOUS BLD VENIPUNCTURE: CPT

## 2023-06-11 LAB
T3 SERPL-MCNC: 0.8 NG/ML
T4 FREE SERPL-MCNC: 0.96 NG/DL (ref 0.61–1.12)

## 2023-06-14 LAB — TSI SER-ACNC: 0.76 IU/L (ref 0–0.55)

## 2023-06-14 NOTE — RESULT ENCOUNTER NOTE
Please let her know that the antibody level is improved, but still elevated  She should continue the methimazole twice per week   Thank you
no

## 2023-10-09 ENCOUNTER — RA CDI HCC (OUTPATIENT)
Dept: OTHER | Facility: HOSPITAL | Age: 53
End: 2023-10-09

## 2023-10-09 NOTE — PROGRESS NOTES
[address] [facility] coding opportunities       Chart reviewed, no opportunity found: CHART REVIEWED, NO OPPORTUNITY FOUND        Patients Insurance        Commercial Insurance: [employer]

## 2023-10-12 NOTE — PROGRESS NOTES
Delia Aquino 1970 female MRN: [id number]      ASSESSMENT/PLAN  Problem List Items Addressed This Visit        Cardiovascular and Mediastinum    Essential hypertension    Relevant Medications    metoprolol succinate (TOPROL-XL) 25 mg 24 hr tablet    hydrochlorothiazide (HYDRODIURIL) 12.5 mg tablet       Other    B12 deficiency    Relevant Orders    Vitamin B12    Abnormal serum iron level    Relevant Orders    CBC and differential    Iron Panel (Includes Ferritin, Iron Sat%, Iron, and TIBC)   Other Visit Diagnoses     Healthcare maintenance    -  Primary    Screening for diabetes mellitus        Relevant Orders    Comprehensive metabolic panel    Screening, lipid        Relevant Orders    Lipid panel    Encounter for hepatitis C screening test for low risk patient        Relevant Orders    Hepatitis C antibody        BP WNL   Update labs as above   Hep C screening ordered, pt agreeable   Pap UTD -- will request records (Dr. De Leon Slider)  Mammogram DUE -- has script, going to schedule   CRC UTD   Vaccinations: TDap UTD, Shingles going to schedule either here/pharmacy, Flu deferred, COVID completed primary   Encouraged regular physical activity, varied diet, and regular dental/eye exams         Future Appointments   Date Time Provider [address] [place] [address]   10/26/2023 10:00 AM Shruthi Purvis MD [facility]          SUBJECTIVE  CC: Physical Exam (Medication refills, talk about blood pressure )      HPI:  Lamine Clemons is a 48 y.o. female who presents for annual physical. History reviewed and updated as below. No acute concerns. Review of Systems   Constitutional:  Negative for unexpected weight change (has gained some weight since starting Methimazole, diet is a "trainwreck", does try to get some activity). HENT:  Negative for congestion, ear pain, rhinorrhea and sore throat. Eyes:  Negative for visual disturbance.    Respiratory: Negative for cough and shortness of breath. Cardiovascular:  Negative for chest pain, palpitations and leg swelling. Gastrointestinal:  Negative for abdominal pain, constipation and diarrhea. Endocrine: Negative for polyuria. Genitourinary:  Negative for dysuria and menstrual problem (no menses with Depo). Neurological:  Negative for dizziness and headaches. Psychiatric/Behavioral:  Positive for sleep disturbance (falls asleep without issue, but wakes up between 2-3a).         Historical Information   The patient history was reviewed and updated as follows:    Past Medical History:   Diagnosis Date   • Lumbar herniated disc      Past Surgical History:   Procedure Laterality Date   • OOPHORECTOMY      Ruptured     Family History   Problem Relation Age of Onset   • No Known Problems Mother    • Hypertension Father    • Valvular heart disease Father    • Alcohol abuse Maternal Aunt       Social History   Social History     Substance and Sexual Activity   Alcohol Use Yes   • Alcohol/week: 4.0 standard drinks of alcohol   • Types: 3 Glasses of wine, 1 Standard drinks or equivalent per week     Social History     Substance and Sexual Activity   Drug Use Never     Social History     Tobacco Use   Smoking Status Never   Smokeless Tobacco Never       Medications:     Current Outpatient Medications:   •  cyanocobalamin (VITAMIN B-12) 100 mcg tablet, Take 100 mcg by mouth daily, Disp: , Rfl:   •  hydrochlorothiazide (HYDRODIURIL) 12.5 mg tablet, Take 1 tablet (12.5 mg total) by mouth daily, Disp: 90 tablet, Rfl: 1  •  medroxyPROGESTERone (DEPO-PROVERA) 150 mg/mL injection, Inject 150 mg into a muscle every 3 (three) months , Disp: , Rfl:   •  methimazole (TAPAZOLE) 5 mg tablet, Take 0.5 tablets (2.5 mg total) by mouth 2 (two) times a week, Disp: 12 tablet, Rfl: 2  •  metoprolol succinate (TOPROL-XL) 25 mg 24 hr tablet, Take 1 tablet (25 mg total) by mouth daily, Disp: 30 tablet, Rfl: 0  Allergies   Allergen Reactions   • Loratadine Other (See Comments)     HYPER       OBJECTIVE    Vitals:   Vitals:    10/13/23 0832   BP: 134/82   Pulse: 70   Temp: 97.9 °F (36.6 °C)   SpO2: 98%   Weight: 103 kg (226 lb 4.8 oz)           Physical Exam  Vitals and nursing note reviewed. Constitutional:       General: She is not in acute distress. Appearance: Normal appearance. HENT:      Head: Normocephalic and atraumatic. Right Ear: Tympanic membrane, ear canal and external ear normal.      Left Ear: Tympanic membrane, ear canal and external ear normal.      Nose: Nose normal.      Mouth/Throat:      Mouth: Mucous membranes are moist.      Pharynx: No oropharyngeal exudate or posterior oropharyngeal erythema. Eyes:      Conjunctiva/sclera: Conjunctivae normal.   Cardiovascular:      Rate and Rhythm: Normal rate and regular rhythm. Pulmonary:      Effort: Pulmonary effort is normal. No respiratory distress. Breath sounds: Normal breath sounds. Abdominal:      General: Bowel sounds are normal. There is no distension. Palpations: Abdomen is soft. Tenderness: There is no abdominal tenderness. Musculoskeletal:      Right lower leg: No edema. Left lower leg: No edema. Lymphadenopathy:      Cervical: No cervical adenopathy. Skin:     General: Skin is warm and dry. Neurological:      Mental Status: She is alert.       Comments: Grossly intact   Psychiatric:         Mood and Affect: Mood normal.                    Aminata Bhatia DO  [facility]   10/13/2023  9:15 AM

## 2023-10-13 ENCOUNTER — OFFICE VISIT (OUTPATIENT)
Dept: FAMILY MEDICINE CLINIC | Facility: CLINIC | Age: 53
End: 2023-10-13
Payer: COMMERCIAL

## 2023-10-13 ENCOUNTER — TELEPHONE (OUTPATIENT)
Dept: ADMINISTRATIVE | Facility: OTHER | Age: 53
End: 2023-10-13

## 2023-10-13 VITALS
DIASTOLIC BLOOD PRESSURE: 82 MMHG | SYSTOLIC BLOOD PRESSURE: 134 MMHG | BODY MASS INDEX: 38.84 KG/M2 | TEMPERATURE: 97.9 F | HEART RATE: 70 BPM | OXYGEN SATURATION: 98 % | WEIGHT: 226.3 LBS

## 2023-10-13 DIAGNOSIS — I10 ESSENTIAL HYPERTENSION: ICD-10-CM

## 2023-10-13 DIAGNOSIS — Z11.59 ENCOUNTER FOR HEPATITIS C SCREENING TEST FOR LOW RISK PATIENT: ICD-10-CM

## 2023-10-13 DIAGNOSIS — E53.8 B12 DEFICIENCY: ICD-10-CM

## 2023-10-13 DIAGNOSIS — Z13.1 SCREENING FOR DIABETES MELLITUS: ICD-10-CM

## 2023-10-13 DIAGNOSIS — Z00.00 HEALTHCARE MAINTENANCE: Primary | ICD-10-CM

## 2023-10-13 DIAGNOSIS — R79.0 ABNORMAL SERUM IRON LEVEL: ICD-10-CM

## 2023-10-13 DIAGNOSIS — Z13.220 SCREENING, LIPID: ICD-10-CM

## 2023-10-13 PROCEDURE — 99396 PREV VISIT EST AGE 40-64: CPT | Performed by: FAMILY MEDICINE

## 2023-10-13 RX ORDER — HYDROCHLOROTHIAZIDE 12.5 MG/1
12.5 TABLET ORAL DAILY
Qty: 90 TABLET | Refills: 1 | Status: SHIPPED | OUTPATIENT
Start: 2023-10-13 | End: 2023-10-13 | Stop reason: SDUPTHER

## 2023-10-13 RX ORDER — METOPROLOL SUCCINATE 25 MG/1
25 TABLET, EXTENDED RELEASE ORAL DAILY
Qty: 90 TABLET | Refills: 1 | Status: SHIPPED | OUTPATIENT
Start: 2023-10-13 | End: 2023-10-13 | Stop reason: SDUPTHER

## 2023-10-13 RX ORDER — HYDROCHLOROTHIAZIDE 12.5 MG/1
12.5 TABLET ORAL DAILY
Qty: 90 TABLET | Refills: 1 | Status: SHIPPED | OUTPATIENT
Start: 2023-10-13

## 2023-10-13 RX ORDER — METOPROLOL SUCCINATE 25 MG/1
25 TABLET, EXTENDED RELEASE ORAL DAILY
Qty: 30 TABLET | Refills: 0 | Status: SHIPPED | OUTPATIENT
Start: 2023-10-13

## 2023-10-13 NOTE — TELEPHONE ENCOUNTER
----- Message from Micha Pabon DO sent at 10/13/2023  9:14 AM EDT -----  10/13/23 9:14 AM    Hello, our patient Naida De Leon has had Pap Smear (HPV) aka Cervical Cancer Screening completed/performed. Please assist in updating the patient chart by making an External outreach to Dr. Elise Patel facility located in [address] [place] [address]. The date of service is 08/2023.     Thank you,  Micha Pabon DO  [facility]

## 2023-10-13 NOTE — LETTER
Procedure Request Form: Cervical Cancer Screening      Date Requested: 10/13/23  Patient: Alethea Aquino  Patient : 1970   Referring Provider: Velia Cortes, DO        Date of Procedure _______2023 report_______________________       The above patient has informed us that they have completed their   most recent Cervical Cancer Screening at your facility. Please complete   this form and attach all corresponding procedure reports/results. Comments __________________________________________________________  ____________________________________________________________________  ____________________________________________________________________  ____________________________________________________________________    Facility Completing Procedure _________________________________________    Form Completed By (print name) _______________________________________      Signature __________________________________________________________      These reports are needed for  compliance. Please fax this completed form and a copy of the procedure report to our office located at [address] [place] [address] as soon as possible to Fax [phone] jose martin Batres: Phone [phone]    We thank you for your assistance in treating our mutual patient.

## 2023-10-13 NOTE — LETTER
Procedure Request Form: Cervical Cancer Screening      Date Requested: 10/16/23  Patient: Oumar Aquino  Patient : 1970   Referring Provider: Carolynn Sanchez DO    2nd Request        Date of Procedure _________2023 report_____________________       The above patient has informed us that they have completed their   most recent Cervical Cancer Screening at your facility. Please complete   this form and attach all corresponding procedure reports/results. Comments __________________________________________________________  ____________________________________________________________________  ____________________________________________________________________  ____________________________________________________________________    Facility Completing Procedure _________________________________________    Form Completed By (print name) _______________________________________      Signature __________________________________________________________      These reports are needed for  compliance. Please fax this completed form and a copy of the procedure report to our office located at [address] [place] [address] as soon as possible to Fax [phone] attention Magaly Chand: Phone [phone]    We thank you for your assistance in treating our mutual patient.

## 2023-10-13 NOTE — TELEPHONE ENCOUNTER
Upon review of the In Basket request and the patient's chart, initial outreach has been made via fax to facility. Please see Contacts section for details.      Thank you  Andres Kim MA

## 2023-10-16 NOTE — TELEPHONE ENCOUNTER
As a follow-up, a second attempt has been made for outreach via fax to facility. Please see Contacts section for details.     Thank you  Kody Philip MA

## 2023-10-19 NOTE — TELEPHONE ENCOUNTER
As a final attempt, a third outreach has been made via telephone call to facility. Please see Contacts section for details. This encounter will be closed and completed by end of day. Should we receive the requested information because of previous outreach attempts, the requested patient's chart will be updated appropriately.      Thank you  Francesco Vallejo MA

## 2023-10-19 NOTE — TELEPHONE ENCOUNTER
Fax arrived to back fax to ensure receipt of this.  I will have central fax get it into the results of the chart and then ask [facility] to link this

## 2023-10-26 ENCOUNTER — OFFICE VISIT (OUTPATIENT)
Dept: ENDOCRINOLOGY | Facility: CLINIC | Age: 53
End: 2023-10-26
Payer: COMMERCIAL

## 2023-10-26 VITALS
BODY MASS INDEX: 38.41 KG/M2 | SYSTOLIC BLOOD PRESSURE: 124 MMHG | HEART RATE: 83 BPM | DIASTOLIC BLOOD PRESSURE: 86 MMHG | WEIGHT: 225 LBS | HEIGHT: 64 IN

## 2023-10-26 DIAGNOSIS — E05.90 HYPERTHYROIDISM: Primary | ICD-10-CM

## 2023-10-26 DIAGNOSIS — E05.00 GRAVES DISEASE: ICD-10-CM

## 2023-10-26 PROCEDURE — 99214 OFFICE O/P EST MOD 30 MIN: CPT | Performed by: STUDENT IN AN ORGANIZED HEALTH CARE EDUCATION/TRAINING PROGRAM

## 2023-10-26 NOTE — PROGRESS NOTES
[facility] 48 y.o. female MRN: [id number]    Encounter: [id number]      Assessment/Plan     Assessment: This is a 48y.o.-year-old female with past medical history of hyperthyroidism due to Graves' disease who is seen for follow up for hyperthyroidism due to Graves' disease    Plan:  Hyperthyroidism due to Graves' disease  TSH 1.326, free T4 0.76, total T3 0.8, TSI 0.76 in June 2023  - Recommended the patient to discontinue methimazole 2.5 mg twice weekly, given that she has had significant improvement in her TSI from 8.54 in January 2022 to 0.76 in June 2023  - We will obtain TSH, free T4, total T3 in about 6 weeks off of methimazole  - Discussed with the patient that she should alert us if she has any development of symptoms of hyperthyroidism    CC:   hyperthyroidism due to Graves' disease    History of Present Illness     HPI:  This is a 48y.o.-year-old female with past medical history of hyperthyroidism due to Graves' disease who is seen for follow up for hyperthyroidism due to Graves' disease    She was last seen in April 2023 by Dr. Julia Gaines and she is transferring her care to me    Current regimen: Methimazole 2.5 mg twice weekly, last dose adjustment was made in April 2023. She has been on methimazole since November 2021 which is when she was diagnosed with hyperthyroidism    She denies of any palpitations, anxiety, tremors. Denies of any rash, sore throat with fever,any known liver function abnormalities    She noted to have increased hair growth since last visit. She has has weight gain of 25 lbs since 2022. She reports to wakes up multiple times during the night, which has been ongoing for years. Reports to sleep better in [place] than in [place]. She complains of fatigue for about 20 years. Denies of skin, nail, bowel habit related changes.  She is on depo-provera for endometriosis and has not had menses     She denies of any tobacco use or vision eye disturbance    She is on metoprolol succinate for hypertension    Review of Systems   Constitutional:  Positive for fatigue and unexpected weight change. Negative for activity change and appetite change. HENT:  Negative for congestion, sore throat, trouble swallowing and voice change. Eyes:  Negative for redness and visual disturbance. Respiratory:  Negative for cough and shortness of breath. Cardiovascular:  Negative for palpitations and leg swelling. Gastrointestinal:  Negative for abdominal pain, constipation, diarrhea, nausea and vomiting. Endocrine: Negative for cold intolerance, heat intolerance, polydipsia, polyphagia and polyuria. Genitourinary:  Negative for difficulty urinating and dysuria. Musculoskeletal:  Negative for gait problem and neck pain. Skin:  Negative for color change. Neurological:  Negative for dizziness and syncope. Psychiatric/Behavioral:  Negative for agitation and behavioral problems. All other systems reviewed and are negative.       Historical Information   Past Medical History:   Diagnosis Date    Lumbar herniated disc      Past Surgical History:   Procedure Laterality Date    OOPHORECTOMY      Ruptured     Social History   Social History     Substance and Sexual Activity   Alcohol Use Yes    Alcohol/week: 4.0 standard drinks of alcohol    Types: 3 Glasses of wine, 1 Standard drinks or equivalent per week     Social History     Substance and Sexual Activity   Drug Use Never     Social History     Tobacco Use   Smoking Status Never   Smokeless Tobacco Never     Family History:   Family History   Problem Relation Age of Onset    No Known Problems Mother     Hypertension Father     Valvular heart disease Father     Alcohol abuse Maternal Aunt        Meds/Allergies   Current Outpatient Medications   Medication Sig Dispense Refill    cyanocobalamin (VITAMIN B-12) 100 mcg tablet Take 100 mcg by mouth daily      hydrochlorothiazide (HYDRODIURIL) 12.5 mg tablet Take 1 tablet (12.5 mg total) by mouth daily 90 tablet 1    medroxyPROGESTERone (DEPO-PROVERA) 150 mg/mL injection Inject 150 mg into a muscle every 3 (three) months       methimazole (TAPAZOLE) 5 mg tablet Take 0.5 tablets (2.5 mg total) by mouth 2 (two) times a week 12 tablet 2    metoprolol succinate (TOPROL-XL) 25 mg 24 hr tablet Take 1 tablet (25 mg total) by mouth daily 30 tablet 0     No current facility-administered medications for this visit. Allergies   Allergen Reactions    Loratadine Other (See Comments)     HYPER       Objective   Vitals: Blood pressure 124/86, pulse 83, height 5' 4" (1.626 m), weight 102 kg (225 lb). Physical Exam  Constitutional:       Appearance: Normal appearance. HENT:      Head: Normocephalic and atraumatic. Eyes:      Comments: No lid lag noted bilaterally   Neck:      Comments: Firm thyroid noted on palpation  Cardiovascular:      Rate and Rhythm: Normal rate and regular rhythm. Pulses: Normal pulses. Heart sounds: Normal heart sounds. No murmur heard. No gallop. Pulmonary:      Effort: Pulmonary effort is normal.      Breath sounds: Normal breath sounds. No wheezing or rales. Abdominal:      General: Bowel sounds are normal.      Palpations: Abdomen is soft. Tenderness: There is no abdominal tenderness. Musculoskeletal:         General: No swelling. Cervical back: Normal range of motion and neck supple. No tenderness. Right lower leg: No edema. Left lower leg: No edema. Comments: No tremors noted on outstretched hands   Lymphadenopathy:      Cervical: No cervical adenopathy. Skin:     General: Skin is warm. Neurological:      Mental Status: She is alert and oriented to person, place, and time. Mental status is at baseline. Gait: Gait normal.   Psychiatric:         Mood and Affect: Mood normal.         Behavior: Behavior normal.         The history was obtained from the review of the chart, patient.     Lab Results:   Lab Results   Component Value Date/Time    TSH 3RD GENERATON 1.326 06/10/2023 11:42 AM    TSH 3RD GENERATON 1.481 03/17/2023 11:41 AM    TSH 3RD GENERATON 1.930 01/28/2023 11:23 AM    Free T4 0.96 06/10/2023 11:42 AM    Free T4 0.91 03/17/2023 11:41 AM    Free T4 1.04 01/28/2023 11:23 AM       Imaging Studies:   Results for orders placed during the hospital encounter of 12/16/21    US thyroid    Impression  Enlarged heterogeneous hypervascular thyroid gland. Reference: ACR Thyroid Imaging, Reporting and Data System (TI-RADS): White Paper of the [employer]. J AM Temi Radiol 8333;45:339-540. (additional recommendations based on American Thyroid Association 2015 guidelines.)      Workstation performed: [id number]      I have personally reviewed pertinent reports. Portions of the record may have been created with voice recognition software. Occasional wrong word or "sound a like" substitutions may have occurred due to the inherent limitations of voice recognition software. Read the chart carefully and recognize, using context, where substitutions have occurred.

## 2023-11-06 DIAGNOSIS — I10 ESSENTIAL HYPERTENSION: ICD-10-CM

## 2023-11-06 RX ORDER — METOPROLOL SUCCINATE 25 MG/1
25 TABLET, EXTENDED RELEASE ORAL DAILY
Qty: 90 TABLET | Refills: 1 | Status: SHIPPED | OUTPATIENT
Start: 2023-11-06

## 2024-01-13 ENCOUNTER — APPOINTMENT (OUTPATIENT)
Dept: LAB | Facility: HOSPITAL | Age: 54
End: 2024-01-13
Payer: COMMERCIAL

## 2024-01-13 DIAGNOSIS — Z13.220 SCREENING, LIPID: ICD-10-CM

## 2024-01-13 DIAGNOSIS — Z11.59 ENCOUNTER FOR HEPATITIS C SCREENING TEST FOR LOW RISK PATIENT: ICD-10-CM

## 2024-01-13 DIAGNOSIS — E05.90 HYPERTHYROIDISM: ICD-10-CM

## 2024-01-13 DIAGNOSIS — R79.0 ABNORMAL SERUM IRON LEVEL: ICD-10-CM

## 2024-01-13 DIAGNOSIS — E05.00 GRAVES DISEASE: ICD-10-CM

## 2024-01-13 DIAGNOSIS — E53.8 B12 DEFICIENCY: ICD-10-CM

## 2024-01-13 DIAGNOSIS — Z13.1 SCREENING FOR DIABETES MELLITUS: ICD-10-CM

## 2024-01-13 LAB
ALBUMIN SERPL BCP-MCNC: 4.6 G/DL (ref 3.5–5)
ALP SERPL-CCNC: 46 U/L (ref 34–104)
ALT SERPL W P-5'-P-CCNC: 12 U/L (ref 7–52)
ANION GAP SERPL CALCULATED.3IONS-SCNC: 7 MMOL/L
AST SERPL W P-5'-P-CCNC: 14 U/L (ref 13–39)
BASOPHILS # BLD AUTO: 0.04 THOUSANDS/ÂΜL (ref 0–0.1)
BASOPHILS NFR BLD AUTO: 1 % (ref 0–1)
BILIRUB SERPL-MCNC: 0.47 MG/DL (ref 0.2–1)
BUN SERPL-MCNC: 14 MG/DL (ref 5–25)
CALCIUM SERPL-MCNC: 9.5 MG/DL (ref 8.4–10.2)
CHLORIDE SERPL-SCNC: 104 MMOL/L (ref 96–108)
CHOLEST SERPL-MCNC: 212 MG/DL
CO2 SERPL-SCNC: 27 MMOL/L (ref 21–32)
CREAT SERPL-MCNC: 0.77 MG/DL (ref 0.6–1.3)
EOSINOPHIL # BLD AUTO: 0.06 THOUSAND/ÂΜL (ref 0–0.61)
EOSINOPHIL NFR BLD AUTO: 1 % (ref 0–6)
ERYTHROCYTE [DISTWIDTH] IN BLOOD BY AUTOMATED COUNT: 12.3 % (ref 11.6–15.1)
GFR SERPL CREATININE-BSD FRML MDRD: 88 ML/MIN/1.73SQ M
GLUCOSE P FAST SERPL-MCNC: 96 MG/DL (ref 65–99)
HCT VFR BLD AUTO: 42.3 % (ref 34.8–46.1)
HDLC SERPL-MCNC: 49 MG/DL
HGB BLD-MCNC: 14.1 G/DL (ref 11.5–15.4)
IMM GRANULOCYTES # BLD AUTO: 0.01 THOUSAND/UL (ref 0–0.2)
IMM GRANULOCYTES NFR BLD AUTO: 0 % (ref 0–2)
LDLC SERPL CALC-MCNC: 153 MG/DL (ref 0–100)
LYMPHOCYTES # BLD AUTO: 1.74 THOUSANDS/ÂΜL (ref 0.6–4.47)
LYMPHOCYTES NFR BLD AUTO: 35 % (ref 14–44)
MCH RBC QN AUTO: 31.4 PG (ref 26.8–34.3)
MCHC RBC AUTO-ENTMCNC: 33.3 G/DL (ref 31.4–37.4)
MCV RBC AUTO: 94 FL (ref 82–98)
MONOCYTES # BLD AUTO: 0.35 THOUSAND/ÂΜL (ref 0.17–1.22)
MONOCYTES NFR BLD AUTO: 7 % (ref 4–12)
NEUTROPHILS # BLD AUTO: 2.82 THOUSANDS/ÂΜL (ref 1.85–7.62)
NEUTS SEG NFR BLD AUTO: 56 % (ref 43–75)
NONHDLC SERPL-MCNC: 163 MG/DL
NRBC BLD AUTO-RTO: 0 /100 WBCS
PLATELET # BLD AUTO: 260 THOUSANDS/UL (ref 149–390)
PMV BLD AUTO: 9.6 FL (ref 8.9–12.7)
POTASSIUM SERPL-SCNC: 3.8 MMOL/L (ref 3.5–5.3)
PROT SERPL-MCNC: 7.9 G/DL (ref 6.4–8.4)
RBC # BLD AUTO: 4.49 MILLION/UL (ref 3.81–5.12)
SODIUM SERPL-SCNC: 138 MMOL/L (ref 135–147)
TRIGL SERPL-MCNC: 49 MG/DL
TSH SERPL DL<=0.05 MIU/L-ACNC: 1.96 UIU/ML (ref 0.45–4.5)
WBC # BLD AUTO: 5.02 THOUSAND/UL (ref 4.31–10.16)

## 2024-01-13 PROCEDURE — 80053 COMPREHEN METABOLIC PANEL: CPT

## 2024-01-13 PROCEDURE — 83540 ASSAY OF IRON: CPT

## 2024-01-13 PROCEDURE — 36415 COLL VENOUS BLD VENIPUNCTURE: CPT

## 2024-01-13 PROCEDURE — 80061 LIPID PANEL: CPT

## 2024-01-13 PROCEDURE — 84439 ASSAY OF FREE THYROXINE: CPT

## 2024-01-13 PROCEDURE — 83550 IRON BINDING TEST: CPT

## 2024-01-13 PROCEDURE — 82728 ASSAY OF FERRITIN: CPT

## 2024-01-13 PROCEDURE — 84480 ASSAY TRIIODOTHYRONINE (T3): CPT

## 2024-01-13 PROCEDURE — 84443 ASSAY THYROID STIM HORMONE: CPT

## 2024-01-13 PROCEDURE — 85025 COMPLETE CBC W/AUTO DIFF WBC: CPT

## 2024-01-13 PROCEDURE — 86803 HEPATITIS C AB TEST: CPT

## 2024-01-13 PROCEDURE — 82607 VITAMIN B-12: CPT

## 2024-01-14 LAB
FERRITIN SERPL-MCNC: 116 NG/ML (ref 11–307)
HCV AB SER QL: NORMAL
IRON SATN MFR SERPL: 32 % (ref 15–50)
IRON SERPL-MCNC: 114 UG/DL (ref 50–212)
T3 SERPL-MCNC: 0.9 NG/ML
T4 FREE SERPL-MCNC: 0.94 NG/DL (ref 0.61–1.12)
TIBC SERPL-MCNC: 356 UG/DL (ref 250–450)
UIBC SERPL-MCNC: 242 UG/DL (ref 155–355)
VIT B12 SERPL-MCNC: 201 PG/ML (ref 180–914)

## 2024-02-29 ENCOUNTER — OFFICE VISIT (OUTPATIENT)
Dept: ENDOCRINOLOGY | Facility: CLINIC | Age: 54
End: 2024-02-29
Payer: COMMERCIAL

## 2024-02-29 VITALS
BODY MASS INDEX: 38.62 KG/M2 | HEIGHT: 64 IN | WEIGHT: 226.2 LBS | HEART RATE: 67 BPM | SYSTOLIC BLOOD PRESSURE: 148 MMHG | DIASTOLIC BLOOD PRESSURE: 100 MMHG

## 2024-02-29 DIAGNOSIS — E05.00 GRAVES DISEASE: ICD-10-CM

## 2024-02-29 DIAGNOSIS — E05.90 HYPERTHYROIDISM: Primary | ICD-10-CM

## 2024-02-29 PROCEDURE — 99214 OFFICE O/P EST MOD 30 MIN: CPT | Performed by: STUDENT IN AN ORGANIZED HEALTH CARE EDUCATION/TRAINING PROGRAM

## 2024-02-29 NOTE — PROGRESS NOTES
Geetha Aquino 54 y.o. female MRN: [id number]    Encounter: [id number]      Assessment/Plan     Assessment:  This is a 54 y.o.-year-old female with past medical history of hyperthyroidism due to Graves' disease who is seen for follow up for hyperthyroidism due to Graves' disease     Plan:  Hyperthyroidism due to Graves' disease   TSH 1.962, free t4 0.94, total T3 0.9  - Patient is biochemically and clinically doing well and is not on any pharmacotherapy since Oct 2023  - Will obtain TSH, free T4, T3 in May and follow-up with us in 6 months  - Recommended the patient to call us if she has any symptoms of hyperthyroidism     CC:   Hyperthyroidism due to Graves' disease     History of Present Illness     HPI:  This is a 54 y.o.-year-old female with past medical history of hyperthyroidism due to Graves' disease who is seen for follow up for hyperthyroidism due to Graves' disease     She was last seen in Oct 2023    Since the last visit, she has been off of methimazole since Oct 2023, was previously on methimazole 2.5mg twice weekly and was treated with methimazole for a total of 2 yrs    Denies of any palpitations, anxiety, tremors, sleep disturbances, energy, weight,  skin, nail, bowel habit related changes. She is on depo-provera for endometriosis and has not had menses.  She has hot flashes at night and is attributing this to being perimenopausal.  She denies of any tobacco use or vision eye disturbance     She continues to be on metoprolol succinate for hypertension     Review of Systems   Constitutional:  Negative for activity change, appetite change and fatigue.   HENT:  Negative for congestion, sore throat, trouble swallowing and voice change.    Eyes:  Negative for redness and visual disturbance.   Respiratory:  Negative for cough and shortness of breath.    Cardiovascular:  Negative for palpitations and leg swelling.   Gastrointestinal:  Negative for abdominal pain, constipation, diarrhea, nausea  "and vomiting.   Endocrine: Negative for cold intolerance, heat intolerance, polydipsia, polyphagia and polyuria.   Genitourinary:  Negative for difficulty urinating and dysuria.   Musculoskeletal:  Negative for gait problem and neck pain.   Skin:  Negative for color change.   Neurological:  Negative for dizziness and syncope.   Psychiatric/Behavioral:  Negative for agitation and behavioral problems.    All other systems reviewed and are negative.      Historical Information   Past Medical History:   Diagnosis Date    Lumbar herniated disc      Past Surgical History:   Procedure Laterality Date    OOPHORECTOMY      Ruptured     Social History   Social History     Substance and Sexual Activity   Alcohol Use Yes    Alcohol/week: 4.0 standard drinks of alcohol    Types: 3 Glasses of wine, 1 Standard drinks or equivalent per week     Social History     Substance and Sexual Activity   Drug Use Never     Social History     Tobacco Use   Smoking Status Never   Smokeless Tobacco Never     Family History:   Family History   Problem Relation Age of Onset    No Known Problems Mother     Hypertension Father     Valvular heart disease Father     Alcohol abuse Maternal Aunt        Meds/Allergies   Current Outpatient Medications   Medication Sig Dispense Refill    cyanocobalamin (VITAMIN B-12) 100 mcg tablet Take 100 mcg by mouth daily      hydrochlorothiazide (HYDRODIURIL) 12.5 mg tablet Take 1 tablet (12.5 mg total) by mouth daily 90 tablet 1    medroxyPROGESTERone (DEPO-PROVERA) 150 mg/mL injection Inject 150 mg into a muscle every 3 (three) months       metoprolol succinate (TOPROL-XL) 25 mg 24 hr tablet Take 1 tablet (25 mg total) by mouth daily 90 tablet 1     No current facility-administered medications for this visit.     Allergies   Allergen Reactions    Loratadine Other (See Comments)     HYPER       Objective   Vitals: Blood pressure 148/100, pulse 67, height 5' 4\" (1.626 m), weight 103 kg (226 lb 3.2 oz).    Physical " Exam  Constitutional:       Appearance: Normal appearance.   HENT:      Head: Normocephalic and atraumatic.   Eyes:      Comments: No lid lag noted bilaterally   Neck:      Comments: Firm thyroid on palpation  Cardiovascular:      Rate and Rhythm: Normal rate and regular rhythm.      Pulses: Normal pulses.      Heart sounds: Normal heart sounds. No murmur heard.     No gallop.   Pulmonary:      Effort: Pulmonary effort is normal.      Breath sounds: Normal breath sounds. No wheezing or rales.   Abdominal:      General: Bowel sounds are normal.      Palpations: Abdomen is soft.      Tenderness: There is no abdominal tenderness.   Musculoskeletal:         General: No swelling.      Cervical back: Normal range of motion and neck supple. No tenderness.      Right lower leg: No edema.      Left lower leg: No edema.      Comments: No tremors noted on outstretched hands   Lymphadenopathy:      Cervical: No cervical adenopathy.   Skin:     General: Skin is warm.   Neurological:      Mental Status: She is alert and oriented to person, place, and time. Mental status is at baseline.      Deep Tendon Reflexes: Reflexes normal.   Psychiatric:         Mood and Affect: Mood normal.         Behavior: Behavior normal.         The history was obtained from the review of the chart, patient.    Lab Results:   Component      Latest Ref Rng 6/10/2023 1/13/2024   TSH 3RD GENERATON      0.450 - 4.500 uIU/mL 1.326  1.962    THYROID STIMULATING IMMUNOGLOBULIN      0.00 - 0.55 IU/L 0.76 (H)     T3, Total      0.9-1.8 ng/mL ng/mL 0.8 (L)  0.9    FREE T4      0.61 - 1.12 ng/dL 0.96  0.94       Legend:  (H) High  (L) Low      Imaging Studies:   Results for orders placed during the hospital encounter of 12/16/21    US thyroid    Impression  Enlarged heterogeneous hypervascular thyroid gland.        Reference: ACR Thyroid Imaging, Reporting and Data System (TI-RADS): White Paper of the ACR TI-RADS Committee. J AM Temi Radiol 2017;14:587-595.  "(additional recommendations based on American Thyroid Association 2015 guidelines.)      Workstation performed: [id number]      I have personally reviewed pertinent reports.      Portions of the record may have been created with voice recognition software. Occasional wrong word or \"sound a like\" substitutions may have occurred due to the inherent limitations of voice recognition software. Read the chart carefully and recognize, using context, where substitutions have occurred.    "

## 2024-07-06 ENCOUNTER — APPOINTMENT (OUTPATIENT)
Dept: LAB | Facility: HOSPITAL | Age: 54
End: 2024-07-06
Payer: COMMERCIAL

## 2024-07-06 DIAGNOSIS — E05.00 GRAVES DISEASE: ICD-10-CM

## 2024-07-06 DIAGNOSIS — E05.90 HYPERTHYROIDISM: ICD-10-CM

## 2024-07-06 LAB
T3 SERPL-MCNC: 0.9 NG/ML
T4 FREE SERPL-MCNC: 0.77 NG/DL (ref 0.61–1.12)
TSH SERPL DL<=0.05 MIU/L-ACNC: 1.16 UIU/ML (ref 0.45–4.5)

## 2024-07-06 PROCEDURE — 84443 ASSAY THYROID STIM HORMONE: CPT

## 2024-07-06 PROCEDURE — 36415 COLL VENOUS BLD VENIPUNCTURE: CPT

## 2024-07-06 PROCEDURE — 84480 ASSAY TRIIODOTHYRONINE (T3): CPT

## 2024-07-06 PROCEDURE — 84439 ASSAY OF FREE THYROXINE: CPT

## 2024-07-08 NOTE — TELEPHONE ENCOUNTER
Pt scheduled for physical at end of October (due 10/14/24) She doesn't have any more refills on her birth control.

## 2024-07-09 RX ORDER — MEDROXYPROGESTERONE ACETATE 150 MG/ML
150 INJECTION, SUSPENSION INTRAMUSCULAR
Qty: 1 ML | Refills: 1 | OUTPATIENT
Start: 2024-07-09

## 2024-08-28 NOTE — PROGRESS NOTES
Geetha Aquino 54 y.o. female MRN: [id number]    Encounter: [id number]    Assessment & Plan     1. Graves' disease in remission  Assessment & Plan:  Off of pharmacotherapy since October 2023  Recent July 2024 labs normal, feeling well    Will release patient to PCP follow-up  She can get routine TFTs every 6 months for now, eventually spacing out to 1 year if no recurrence  Patient will let us know if she experiences symptom recurrence and we will order labs  Did discuss that if symptoms do recur, we would initiate medical therapy but she would be more likely to require definitive treatment with surgery or DUFFY, at which time she will require lifelong supplementation with thyroid hormone  Counseled against iodine containing supplements like seamoss  Did encourage patient discuss shingles vaccine with PCP, she is concerned as Graves did start shortly after COVID vaccination    Return if symptom recurrence    CC: graves fu     History of Present Illness     HPI:  Geetha Aquino is a 54 y.o. female presents for a follow-up regarding her Graves' disease.  Last seen by Dr. Kilgore 2/29/2024.At the time she was clinically and biochemically euthyroid.  Has been off of pharmacotherapy since October 2023, after 2 years of methimazole therapy.    Overall doing well, pleased to be stable off of medication  Does not take any supplements other than occasional vitamin  Is thinking about getting shingles vaccine but is worried as her Graves' did start shortly after getting a COVID-vaccine.     7/6/24 TSH 1.157, free T4 0.77, T30.9      Review of Systems   Constitutional:  Negative for activity change, appetite change and unexpected weight change.   HENT:  Negative for trouble swallowing.    Respiratory:  Negative for shortness of breath.    Cardiovascular:  Negative for chest pain and palpitations.   Neurological:  Negative for tremors, weakness and headaches.       Historical Information   Past Medical  "History:   Diagnosis Date    Lumbar herniated disc      Past Surgical History:   Procedure Laterality Date    OOPHORECTOMY      Ruptured     Social History   Social History     Substance and Sexual Activity   Alcohol Use Yes    Alcohol/week: 4.0 standard drinks of alcohol    Types: 3 Glasses of wine, 1 Standard drinks or equivalent per week     Social History     Substance and Sexual Activity   Drug Use Never     Social History     Tobacco Use   Smoking Status Never   Smokeless Tobacco Never     Family History:   Family History   Problem Relation Age of Onset    No Known Problems Mother     Hypertension Father     Valvular heart disease Father     Alcohol abuse Maternal Aunt        Meds/Allergies   Current Outpatient Medications   Medication Sig Dispense Refill    cyanocobalamin (VITAMIN B-12) 100 mcg tablet Take 100 mcg by mouth daily      hydrochlorothiazide (HYDRODIURIL) 12.5 mg tablet Take 1 tablet (12.5 mg total) by mouth daily 90 tablet 1    medroxyPROGESTERone (DEPO-PROVERA) 150 mg/mL injection Inject 150 mg into a muscle every 3 (three) months       metoprolol succinate (TOPROL-XL) 25 mg 24 hr tablet Take 1 tablet (25 mg total) by mouth daily 90 tablet 1     No current facility-administered medications for this visit.     Allergies   Allergen Reactions    Loratadine Other (See Comments)     HYPER       Objective   Vitals: Blood pressure 140/90, pulse 67, height 5' 4\" (1.626 m), weight 103 kg (226 lb 6.4 oz), SpO2 97%.    Physical Exam  Constitutional:       General: She is not in acute distress.     Appearance: Normal appearance. She is not ill-appearing, toxic-appearing or diaphoretic.   HENT:      Head: Normocephalic and atraumatic.      Nose: Nose normal.   Eyes:      Extraocular Movements: Extraocular movements intact.      Conjunctiva/sclera: Conjunctivae normal.      Pupils: Pupils are equal, round, and reactive to light.   Neck:      Thyroid: No thyroid mass, thyromegaly or thyroid tenderness. " "  Pulmonary:      Effort: Pulmonary effort is normal. No respiratory distress.   Abdominal:      General: There is no distension.   Musculoskeletal:         General: No deformity.      Right lower leg: No edema.      Left lower leg: No edema.   Skin:     General: Skin is warm and dry.   Neurological:      General: No focal deficit present.      Mental Status: She is alert. Mental status is at baseline.   Psychiatric:         Mood and Affect: Mood normal.         Behavior: Behavior normal.         Thought Content: Thought content normal.         The history was obtained from the review of the chart, patient.    Lab Results:   Lab Results   Component Value Date/Time    WBC 5.02 01/13/2024 11:05 AM    Hemoglobin 14.1 01/13/2024 11:05 AM    Hematocrit 42.3 01/13/2024 11:05 AM    MCV 94 01/13/2024 11:05 AM    Platelets 260 01/13/2024 11:05 AM    BUN 14 01/13/2024 11:05 AM    Potassium 3.8 01/13/2024 11:05 AM    Chloride 104 01/13/2024 11:05 AM    CO2 27 01/13/2024 11:05 AM    Creatinine 0.77 01/13/2024 11:05 AM    AST 14 01/13/2024 11:05 AM    ALT 12 01/13/2024 11:05 AM    Total Protein 7.9 01/13/2024 11:05 AM    Albumin 4.6 01/13/2024 11:05 AM    HDL, Direct 49 (L) 01/13/2024 11:05 AM    Triglycerides 49 01/13/2024 11:05 AM       Imaging Studies: I have personally reviewed pertinent reports.      Portions of the record may have been created with voice recognition software. Occasional wrong word or \"sound a like\" substitutions may have occurred due to the inherent limitations of voice recognition software. Read the chart carefully and recognize, using context, where substitutions have occurred.  "

## 2024-08-29 ENCOUNTER — OFFICE VISIT (OUTPATIENT)
Dept: ENDOCRINOLOGY | Facility: CLINIC | Age: 54
End: 2024-08-29
Payer: COMMERCIAL

## 2024-08-29 VITALS
OXYGEN SATURATION: 97 % | BODY MASS INDEX: 38.65 KG/M2 | HEIGHT: 64 IN | WEIGHT: 226.4 LBS | SYSTOLIC BLOOD PRESSURE: 140 MMHG | HEART RATE: 67 BPM | DIASTOLIC BLOOD PRESSURE: 90 MMHG

## 2024-08-29 DIAGNOSIS — E05.00 GRAVES' DISEASE IN REMISSION: Primary | ICD-10-CM

## 2024-08-29 PROBLEM — E05.90 HYPERTHYROIDISM: Status: RESOLVED | Noted: 2021-10-01 | Resolved: 2024-08-29

## 2024-08-29 PROCEDURE — 99214 OFFICE O/P EST MOD 30 MIN: CPT | Performed by: STUDENT IN AN ORGANIZED HEALTH CARE EDUCATION/TRAINING PROGRAM

## 2024-08-29 NOTE — ASSESSMENT & PLAN NOTE
Off of pharmacotherapy since October 2023  Recent July 2024 labs normal, feeling well    Will release patient to PCP follow-up  She can get routine TFTs every 6 months for now, eventually spacing out to 1 year if no recurrence  Patient will let us know if she experiences symptom recurrence and we will order labs  Did discuss that if symptoms do recur, we would initiate medical therapy but she would be more likely to require definitive treatment with surgery or DUFFY, at which time she will require lifelong supplementation with thyroid hormone  Counseled against iodine containing supplements like seamoss  Did encourage patient discuss shingles vaccine with PCP, she is concerned as Graves did start shortly after COVID vaccination

## 2024-08-29 NOTE — PATIENT INSTRUCTIONS
See us if symptoms return!  Routine monitoring with PCP, every 6 months or so     Goal ~1000mg calcium     A Guide to Calcium-Rich Foods  We all know that milk is a great source of calcium, but you may be surprised by all the different foods you can work into your diet to reach your daily recommended amount of calcium. Use the guide below to get ideas of additional calcium-rich foods to add to your weekly shopping list.  Produce Serving Size Estimated Calcium*   Jerry greens, frozen 8 oz 360 mg   Broccoli waqas 8 oz 200 mg   Kale, frozen 8 oz 180 mg   Soy Beans, green, boiled 8 oz 175 mg   Bok Devan, cooked, boiled 8 oz 160 mg   Figs, dried 2 figs 65 mg   Broccoli, fresh, cooked 8 oz 60 mg   Oranges 1 whole 55 mg   Seafood Serving Size Estimated Calcium*   Sardines, canned with bones 3 oz 325 mg   [place], canned with bones 3 oz 180 mg   Shrimp, canned 3 oz 125 mg   Dairy Serving Size Estimated Calcium*   Ricotta, part-skim 4 oz 335 mg   Yogurt, plain, low-fat 6 oz 310 mg   Milk, skim, low-fat, whole 8 oz 300 mg   Yogurt with fruit, low-fat 6 oz 260 mg   Mozzarella, part-skim 1 oz 210 mg   Cheddar 1 oz 205 mg   Yogurt, Greek 6 oz 200 mg   American Cheese 1 oz 195 mg   Feta Cheese 4 oz 140 mg   Cottage Cheese, 2% 4 oz 105 mg   Frozen yogurt, vanilla 8 oz 105 mg   Ice Cream, vanilla 8 oz 85 mg   Parmesan 1 tbsp 55 mg   Fortified Food Serving Size Estimated Calcium*   [place] milk, rice milk or soy milk, fortified 8 oz 300 mg   Orange juice and other fruit juices, fortified 8 oz 300 mg   Tofu, prepared with calcium 4 oz 205 mg   Waffle, frozen, fortified 2 pieces 200 mg   Oatmeal, fortified 1 packet 140 mg   English muffin, fortified 1 muffin 100 mg   Cereal, fortified 8 oz 100-1,000 mg   Other Serving Size Estimated Calcium*   Mac & cheese, frozen 1 package 325 mg   Pizza, cheese, frozen 1 serving 115 mg   Pudding, chocolate, prepared with 2% milk 4 oz 160 mg   Beans, baked, canned 4 oz 160 mg   *The calcium content  listed for most foods is estimated and can vary due to multiple factors. Check the food label to determine how much calcium is in a particular product.

## 2024-10-04 DIAGNOSIS — I10 ESSENTIAL HYPERTENSION: ICD-10-CM

## 2024-10-04 RX ORDER — METOPROLOL SUCCINATE 25 MG/1
25 TABLET, EXTENDED RELEASE ORAL DAILY
Qty: 90 TABLET | Refills: 0 | Status: SHIPPED | OUTPATIENT
Start: 2024-10-04

## 2024-10-04 RX ORDER — HYDROCHLOROTHIAZIDE 12.5 MG/1
12.5 TABLET ORAL DAILY
Qty: 90 TABLET | Refills: 0 | Status: SHIPPED | OUTPATIENT
Start: 2024-10-04

## 2024-11-07 NOTE — PROGRESS NOTES
"Ambulatory Visit  Name: Geetha Aquino      : 1970      MRN: [id number]  Encounter Provider: Aminata Bhatia DO  Encounter Date: 2024   Encounter department: [facility]    Assessment & Plan  Healthcare maintenance  BP WNL   Update labs as below   Pap UTD   Mammogram DUE -- encouraged to schedule   CRC UTD  Vaccinations: TDap UTD, Shingles reviewed -- pt is thinking about it, Flu defers, COVID defers booster   Encouraged regular physical activity, varied diet, and regular dental/eye exams          Essential hypertension    Orders:    Comprehensive metabolic panel; Future    Lipid panel; Future    CBC and differential; Future    hydroCHLOROthiazide 12.5 mg tablet; Take 1 tablet (12.5 mg total) by mouth daily    metoprolol succinate (TOPROL-XL) 25 mg 24 hr tablet; Take 1 tablet (25 mg total) by mouth daily    Graves' disease in remission    Orders:    TSH, 3rd generation with Free T4 reflex; Future    T4, free; Future    T3; Future    TSH, 3rd generation with Free T4 reflex; Future    T4, free; Future    T3; Future    B12 deficiency    Orders:    Vitamin B12; Future    Abnormal serum iron level    Orders:    CBC and differential; Future    Iron Panel (Includes Ferritin, Iron Sat%, Iron, and TIBC); Future       History of Present Illness     HPI    Pt presents for annual physical     Has been released from Endo -- they suggested monitoring thyroid functions q6 months   BP has been borderline at recent office visits -- pt feels it is related to her weight gain       Review of Systems   Constitutional:  Negative for unexpected weight change (struggling to lose weight -- was more active over the summer, thinks her portions are too much).   HENT:  Negative for congestion, ear pain, rhinorrhea and sore throat.         \"Occasional allergies\"   Eyes:  Negative for visual disturbance.   Respiratory:  Negative for cough and shortness of breath.    Cardiovascular:  Negative for " "chest pain, palpitations and leg swelling.   Gastrointestinal:  Negative for abdominal pain, blood in stool, constipation and diarrhea.   Endocrine: Negative for polyuria.   Genitourinary:  Negative for dysuria, hematuria and vaginal bleeding.   Neurological:  Negative for dizziness and headaches.   Psychiatric/Behavioral:  Negative for sleep disturbance.      Medical History Reviewed by provider this encounter:           Objective     /88   Pulse 72   Temp 98.1 °F (36.7 °C)   Ht 5' 4\" (1.626 m)   Wt 103 kg (226 lb 11.2 oz)   SpO2 98%   BMI 38.91 kg/m²     Physical Exam  Vitals and nursing note reviewed.   Constitutional:       General: She is not in acute distress.     Appearance: She is well-developed.   HENT:      Head: Normocephalic and atraumatic.      Right Ear: Tympanic membrane, ear canal and external ear normal.      Left Ear: Tympanic membrane, ear canal and external ear normal.      Nose: Nose normal. No rhinorrhea.      Mouth/Throat:      Mouth: Mucous membranes are moist.      Pharynx: No oropharyngeal exudate or posterior oropharyngeal erythema.   Eyes:      Conjunctiva/sclera: Conjunctivae normal.   Cardiovascular:      Rate and Rhythm: Normal rate and regular rhythm.   Pulmonary:      Effort: Pulmonary effort is normal. No respiratory distress.      Breath sounds: Normal breath sounds.   Abdominal:      General: Bowel sounds are normal. There is no distension.      Palpations: Abdomen is soft.      Tenderness: There is no abdominal tenderness.   Musculoskeletal:      Right lower leg: No edema.      Left lower leg: No edema.   Lymphadenopathy:      Cervical: No cervical adenopathy.   Skin:     General: Skin is warm and dry.   Neurological:      Mental Status: She is alert.      Comments: Grossly intact   Psychiatric:         Mood and Affect: Mood normal.       "

## 2024-11-08 ENCOUNTER — OFFICE VISIT (OUTPATIENT)
Dept: FAMILY MEDICINE CLINIC | Facility: CLINIC | Age: 54
End: 2024-11-08
Payer: COMMERCIAL

## 2024-11-08 VITALS
HEIGHT: 64 IN | TEMPERATURE: 98.1 F | SYSTOLIC BLOOD PRESSURE: 136 MMHG | OXYGEN SATURATION: 98 % | DIASTOLIC BLOOD PRESSURE: 88 MMHG | WEIGHT: 226.7 LBS | BODY MASS INDEX: 38.7 KG/M2 | HEART RATE: 72 BPM

## 2024-11-08 DIAGNOSIS — I10 ESSENTIAL HYPERTENSION: ICD-10-CM

## 2024-11-08 DIAGNOSIS — R79.0 ABNORMAL SERUM IRON LEVEL: ICD-10-CM

## 2024-11-08 DIAGNOSIS — Z00.00 HEALTHCARE MAINTENANCE: Primary | ICD-10-CM

## 2024-11-08 DIAGNOSIS — E05.00 GRAVES' DISEASE IN REMISSION: ICD-10-CM

## 2024-11-08 DIAGNOSIS — E53.8 B12 DEFICIENCY: ICD-10-CM

## 2024-11-08 PROCEDURE — 99396 PREV VISIT EST AGE 40-64: CPT | Performed by: FAMILY MEDICINE

## 2024-11-08 RX ORDER — HYDROCHLOROTHIAZIDE 12.5 MG/1
12.5 TABLET ORAL DAILY
Qty: 90 TABLET | Refills: 1 | Status: SHIPPED | OUTPATIENT
Start: 2024-11-08

## 2024-11-08 RX ORDER — METOPROLOL SUCCINATE 25 MG/1
25 TABLET, EXTENDED RELEASE ORAL DAILY
Qty: 90 TABLET | Refills: 1 | Status: SHIPPED | OUTPATIENT
Start: 2024-11-08

## 2024-11-08 NOTE — ASSESSMENT & PLAN NOTE
Orders:    Comprehensive metabolic panel; Future    Lipid panel; Future    CBC and differential; Future    hydroCHLOROthiazide 12.5 mg tablet; Take 1 tablet (12.5 mg total) by mouth daily    metoprolol succinate (TOPROL-XL) 25 mg 24 hr tablet; Take 1 tablet (25 mg total) by mouth daily

## 2024-11-08 NOTE — ASSESSMENT & PLAN NOTE
Orders:    TSH, 3rd generation with Free T4 reflex; Future    T4, free; Future    T3; Future    TSH, 3rd generation with Free T4 reflex; Future    T4, free; Future    T3; Future

## 2025-02-08 ENCOUNTER — APPOINTMENT (OUTPATIENT)
Dept: LAB | Facility: HOSPITAL | Age: 55
End: 2025-02-08
Payer: COMMERCIAL

## 2025-02-08 DIAGNOSIS — R79.0 ABNORMAL SERUM IRON LEVEL: ICD-10-CM

## 2025-02-08 DIAGNOSIS — E53.8 B12 DEFICIENCY: ICD-10-CM

## 2025-02-08 DIAGNOSIS — E05.00 GRAVES' DISEASE IN REMISSION: ICD-10-CM

## 2025-02-08 DIAGNOSIS — I10 ESSENTIAL HYPERTENSION: ICD-10-CM

## 2025-02-08 LAB
ALBUMIN SERPL BCG-MCNC: 4.6 G/DL (ref 3.5–5)
ALP SERPL-CCNC: 37 U/L (ref 34–104)
ALT SERPL W P-5'-P-CCNC: 13 U/L (ref 7–52)
ANION GAP SERPL CALCULATED.3IONS-SCNC: 9 MMOL/L (ref 4–13)
AST SERPL W P-5'-P-CCNC: 15 U/L (ref 13–39)
BASOPHILS # BLD AUTO: 0.05 THOUSANDS/ΜL (ref 0–0.1)
BASOPHILS NFR BLD AUTO: 1 % (ref 0–1)
BILIRUB SERPL-MCNC: 0.5 MG/DL (ref 0.2–1)
BUN SERPL-MCNC: 14 MG/DL (ref 5–25)
CALCIUM SERPL-MCNC: 9.9 MG/DL (ref 8.4–10.2)
CHLORIDE SERPL-SCNC: 103 MMOL/L (ref 96–108)
CHOLEST SERPL-MCNC: 221 MG/DL (ref ?–200)
CO2 SERPL-SCNC: 27 MMOL/L (ref 21–32)
CREAT SERPL-MCNC: 0.76 MG/DL (ref 0.6–1.3)
EOSINOPHIL # BLD AUTO: 0.26 THOUSAND/ΜL (ref 0–0.61)
EOSINOPHIL NFR BLD AUTO: 6 % (ref 0–6)
ERYTHROCYTE [DISTWIDTH] IN BLOOD BY AUTOMATED COUNT: 12.5 % (ref 11.6–15.1)
GFR SERPL CREATININE-BSD FRML MDRD: 89 ML/MIN/1.73SQ M
GLUCOSE P FAST SERPL-MCNC: 91 MG/DL (ref 65–99)
HCT VFR BLD AUTO: 42.5 % (ref 34.8–46.1)
HDLC SERPL-MCNC: 41 MG/DL
HGB BLD-MCNC: 13.9 G/DL (ref 11.5–15.4)
IMM GRANULOCYTES # BLD AUTO: 0.01 THOUSAND/UL (ref 0–0.2)
IMM GRANULOCYTES NFR BLD AUTO: 0 % (ref 0–2)
LDLC SERPL CALC-MCNC: 169 MG/DL (ref 0–100)
LYMPHOCYTES # BLD AUTO: 1.68 THOUSANDS/ΜL (ref 0.6–4.47)
LYMPHOCYTES NFR BLD AUTO: 37 % (ref 14–44)
MCH RBC QN AUTO: 30.8 PG (ref 26.8–34.3)
MCHC RBC AUTO-ENTMCNC: 32.7 G/DL (ref 31.4–37.4)
MCV RBC AUTO: 94 FL (ref 82–98)
MONOCYTES # BLD AUTO: 0.26 THOUSAND/ΜL (ref 0.17–1.22)
MONOCYTES NFR BLD AUTO: 6 % (ref 4–12)
NEUTROPHILS # BLD AUTO: 2.26 THOUSANDS/ΜL (ref 1.85–7.62)
NEUTS SEG NFR BLD AUTO: 50 % (ref 43–75)
NONHDLC SERPL-MCNC: 180 MG/DL
NRBC BLD AUTO-RTO: 0 /100 WBCS
PLATELET # BLD AUTO: 230 THOUSANDS/UL (ref 149–390)
PMV BLD AUTO: 10.8 FL (ref 8.9–12.7)
POTASSIUM SERPL-SCNC: 3.9 MMOL/L (ref 3.5–5.3)
PROT SERPL-MCNC: 7.4 G/DL (ref 6.4–8.4)
RBC # BLD AUTO: 4.52 MILLION/UL (ref 3.81–5.12)
SODIUM SERPL-SCNC: 139 MMOL/L (ref 135–147)
TRIGL SERPL-MCNC: 54 MG/DL (ref ?–150)
TSH SERPL DL<=0.05 MIU/L-ACNC: 0.83 UIU/ML (ref 0.45–4.5)
WBC # BLD AUTO: 4.52 THOUSAND/UL (ref 4.31–10.16)

## 2025-02-08 PROCEDURE — 83540 ASSAY OF IRON: CPT

## 2025-02-08 PROCEDURE — 82728 ASSAY OF FERRITIN: CPT

## 2025-02-08 PROCEDURE — 84439 ASSAY OF FREE THYROXINE: CPT

## 2025-02-08 PROCEDURE — 82607 VITAMIN B-12: CPT

## 2025-02-08 PROCEDURE — 85025 COMPLETE CBC W/AUTO DIFF WBC: CPT

## 2025-02-08 PROCEDURE — 84443 ASSAY THYROID STIM HORMONE: CPT

## 2025-02-08 PROCEDURE — 36415 COLL VENOUS BLD VENIPUNCTURE: CPT

## 2025-02-08 PROCEDURE — 80061 LIPID PANEL: CPT

## 2025-02-08 PROCEDURE — 83550 IRON BINDING TEST: CPT

## 2025-02-08 PROCEDURE — 84480 ASSAY TRIIODOTHYRONINE (T3): CPT

## 2025-02-08 PROCEDURE — 80053 COMPREHEN METABOLIC PANEL: CPT

## 2025-02-09 LAB
FERRITIN SERPL-MCNC: 177 NG/ML (ref 11–307)
IRON SATN MFR SERPL: 39 % (ref 15–50)
IRON SERPL-MCNC: 130 UG/DL (ref 50–212)
T3 SERPL-MCNC: 0.9 NG/ML (ref 0.9–1.8)
T4 FREE SERPL-MCNC: 0.87 NG/DL (ref 0.61–1.12)
TIBC SERPL-MCNC: 331.8 UG/DL (ref 250–450)
TRANSFERRIN SERPL-MCNC: 237 MG/DL (ref 203–362)
UIBC SERPL-MCNC: 202 UG/DL (ref 155–355)
VIT B12 SERPL-MCNC: 399 PG/ML (ref 180–914)

## 2025-02-10 ENCOUNTER — RESULTS FOLLOW-UP (OUTPATIENT)
Dept: FAMILY MEDICINE CLINIC | Facility: CLINIC | Age: 55
End: 2025-02-10

## 2025-07-14 DIAGNOSIS — I10 ESSENTIAL HYPERTENSION: ICD-10-CM

## 2025-07-14 NOTE — TELEPHONE ENCOUNTER
Reason for call:   [x] Refill   [] Prior Auth  [] Other:     Office: [facility]  [x] PCP/Provider -  Aminata Bhatia   [] Specialty/Provider -     Medication: hydroCHLOROthiazide, metoprolol succinate     Dose/Frequency:  12.5 mg, 25 mg XL/ daily     Quantity: 90 day supply    Pharmacy: [facility]   Does the patient have enough for 3 days?   [] Yes   [] No - Send as HP to POD    Mail Away Pharmacy   Does the patient have enough for 10 days?   [x] Yes   [] No - Send as HP to POD

## 2025-07-15 RX ORDER — METOPROLOL SUCCINATE 25 MG/1
25 TABLET, EXTENDED RELEASE ORAL DAILY
Qty: 90 TABLET | Refills: 0 | Status: SHIPPED | OUTPATIENT
Start: 2025-07-15

## 2025-07-15 RX ORDER — HYDROCHLOROTHIAZIDE 12.5 MG/1
12.5 TABLET ORAL DAILY
Qty: 90 TABLET | Refills: 0 | Status: SHIPPED | OUTPATIENT
Start: 2025-07-15